# Patient Record
Sex: MALE | Race: WHITE
[De-identification: names, ages, dates, MRNs, and addresses within clinical notes are randomized per-mention and may not be internally consistent; named-entity substitution may affect disease eponyms.]

---

## 2017-09-20 ENCOUNTER — HOSPITAL ENCOUNTER (INPATIENT)
Dept: HOSPITAL 83 - SDC | Age: 77
LOS: 1 days | Discharge: HOME | DRG: 308 | End: 2017-09-21
Attending: INTERNAL MEDICINE | Admitting: INTERNAL MEDICINE
Payer: COMMERCIAL

## 2017-09-20 VITALS — DIASTOLIC BLOOD PRESSURE: 74 MMHG

## 2017-09-20 VITALS — SYSTOLIC BLOOD PRESSURE: 141 MMHG | DIASTOLIC BLOOD PRESSURE: 83 MMHG

## 2017-09-20 VITALS — HEIGHT: 67.99 IN | BODY MASS INDEX: 32.92 KG/M2 | WEIGHT: 217.19 LBS

## 2017-09-20 VITALS — DIASTOLIC BLOOD PRESSURE: 75 MMHG

## 2017-09-20 VITALS — DIASTOLIC BLOOD PRESSURE: 90 MMHG

## 2017-09-20 VITALS — DIASTOLIC BLOOD PRESSURE: 84 MMHG

## 2017-09-20 VITALS — DIASTOLIC BLOOD PRESSURE: 81 MMHG

## 2017-09-20 VITALS — DIASTOLIC BLOOD PRESSURE: 83 MMHG

## 2017-09-20 VITALS — SYSTOLIC BLOOD PRESSURE: 148 MMHG | DIASTOLIC BLOOD PRESSURE: 93 MMHG

## 2017-09-20 VITALS — DIASTOLIC BLOOD PRESSURE: 77 MMHG | SYSTOLIC BLOOD PRESSURE: 114 MMHG

## 2017-09-20 VITALS — DIASTOLIC BLOOD PRESSURE: 79 MMHG

## 2017-09-20 DIAGNOSIS — I48.92: Primary | ICD-10-CM

## 2017-09-20 DIAGNOSIS — Z92.21: ICD-10-CM

## 2017-09-20 DIAGNOSIS — Z87.891: ICD-10-CM

## 2017-09-20 DIAGNOSIS — G47.33: ICD-10-CM

## 2017-09-20 DIAGNOSIS — Z95.5: ICD-10-CM

## 2017-09-20 DIAGNOSIS — Z79.899: ICD-10-CM

## 2017-09-20 DIAGNOSIS — T39.015A: ICD-10-CM

## 2017-09-20 DIAGNOSIS — Z79.82: ICD-10-CM

## 2017-09-20 DIAGNOSIS — E66.9: ICD-10-CM

## 2017-09-20 DIAGNOSIS — K21.9: ICD-10-CM

## 2017-09-20 DIAGNOSIS — Z82.49: ICD-10-CM

## 2017-09-20 DIAGNOSIS — I48.91: ICD-10-CM

## 2017-09-20 DIAGNOSIS — K29.01: ICD-10-CM

## 2017-09-20 DIAGNOSIS — K29.80: ICD-10-CM
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
## 2017-09-20 DIAGNOSIS — Z83.3: ICD-10-CM

## 2017-09-20 DIAGNOSIS — D68.59: ICD-10-CM

## 2017-09-20 DIAGNOSIS — I10: ICD-10-CM

## 2017-09-20 DIAGNOSIS — H40.9: ICD-10-CM

## 2017-09-20 DIAGNOSIS — K57.30: ICD-10-CM

## 2017-09-20 DIAGNOSIS — Z85.51: ICD-10-CM

## 2017-09-20 DIAGNOSIS — I25.10: ICD-10-CM

## 2017-09-20 LAB
ALBUMIN SERPL-MCNC: 3.2 GM/DL (ref 3.1–4.5)
ALP SERPL-CCNC: 117 U/L (ref 45–117)
ALT SERPL W P-5'-P-CCNC: 21 U/L (ref 12–78)
AST SERPL-CCNC: 15 IU/L (ref 3–35)
BASOPHILS # BLD AUTO: 0 10*3/UL (ref 0–0.1)
BASOPHILS NFR BLD AUTO: 0.2 % (ref 0–1)
BUN SERPL-MCNC: 22 MG/DL (ref 7–24)
CHLORIDE SERPL-SCNC: 109 MMOL/L (ref 98–107)
CHOLEST SERPL-MCNC: 111 MG/DL (ref ?–200)
CK MB SERPL-MCNC: 0.9 NG/ML (ref 0.5–3.6)
CK SERPL-CCNC: 40 U/L (ref 39–308)
CREAT SERPL-MCNC: 1.4 MG/DL (ref 0.7–1.3)
EOSINOPHIL # BLD AUTO: 0.1 10*3/UL (ref 0–0.4)
EOSINOPHIL # BLD AUTO: 1.2 % (ref 1–4)
ERYTHROCYTE [DISTWIDTH] IN BLOOD BY AUTOMATED COUNT: 14 % (ref 0–14.5)
HCT VFR BLD AUTO: 44.8 % (ref 42–52)
HDLC SERPL-MCNC: 40 MG/DL (ref 40–60)
HGB BLD-MCNC: 14.1 G/DL (ref 14–18)
LDH SERPL-CCNC: 162 U/L (ref 87–241)
LDLC SERPL DIRECT ASSAY-MCNC: 43 MG/DL (ref 9–159)
LYMPHOCYTES # BLD AUTO: 1.7 10*3/UL (ref 1.3–4.4)
LYMPHOCYTES NFR BLD AUTO: 16.9 % (ref 27–41)
MAGNESIUM SERPL-MCNC: 2 MG/DL (ref 1.5–2.1)
MCH RBC QN AUTO: 29.8 PG (ref 27–31)
MCHC RBC AUTO-ENTMCNC: 31.5 G/DL (ref 33–37)
MCV RBC AUTO: 94.7 FL (ref 80–94)
MONOCYTES # BLD AUTO: 0.8 10*3/UL (ref 0.1–1)
MONOCYTES NFR BLD MANUAL: 8.2 % (ref 3–9)
NEUT #: 7.1 10*3/UL (ref 2.3–7.9)
NEUT %: 73.1 % (ref 47–73)
NRBC BLD QL AUTO: 0 % (ref 0–0)
PHOSPHATE SERPL-MCNC: 2.7 MG/DL (ref 2.5–4.9)
PLATELET # BLD AUTO: 185 10*3/UL (ref 130–400)
PMV BLD AUTO: 10.7 FL (ref 9.6–12.3)
POTASSIUM SERPL-SCNC: 3.7 MMOL/L (ref 3.5–5.1)
PROT SERPL-MCNC: 7.2 GM/DL (ref 6.4–8.2)
RBC # BLD AUTO: 4.73 10*6/UL (ref 4.5–5.9)
SODIUM SERPL-SCNC: 144 MMOL/L (ref 136–145)
T4 FREE SERPL-MCNC: 1.36 NG/DL (ref 0.76–1.46)
TRIGL SERPL-MCNC: 142 MG/DL (ref ?–150)
TROPONIN I SERPL-MCNC: 0.02 NG/ML (ref ?–0.04)
TSH SERPL DL<=0.005 MIU/L-ACNC: 1.37 UIU/ML (ref 0.36–4.75)
VLDLC SERPL CALC-MCNC: 28 MG/DL (ref 6–40)
WBC NRBC COR # BLD AUTO: 9.8 10*3/UL (ref 4.8–10.8)

## 2017-09-20 PROCEDURE — 0DBP8ZX EXCISION OF RECTUM, VIA NATURAL OR ARTIFICIAL OPENING ENDOSCOPIC, DIAGNOSTIC: ICD-10-PCS | Performed by: INTERNAL MEDICINE

## 2017-09-20 PROCEDURE — 3E073KZ INTRODUCTION OF OTHER DIAGNOSTIC SUBSTANCE INTO CORONARY ARTERY, PERCUTANEOUS APPROACH: ICD-10-PCS

## 2017-09-20 PROCEDURE — 0DB68ZX EXCISION OF STOMACH, VIA NATURAL OR ARTIFICIAL OPENING ENDOSCOPIC, DIAGNOSTIC: ICD-10-PCS | Performed by: INTERNAL MEDICINE

## 2017-09-20 PROCEDURE — 4A02XM4 MEASUREMENT OF CARDIAC TOTAL ACTIVITY, EXTERNAL APPROACH: ICD-10-PCS

## 2017-09-20 PROCEDURE — 0DBN8ZX EXCISION OF SIGMOID COLON, VIA NATURAL OR ARTIFICIAL OPENING ENDOSCOPIC, DIAGNOSTIC: ICD-10-PCS

## 2017-09-20 NOTE — NUR
16 FR F/C INSERTED IN PT AT THIS TIME.  IMMEDIATE RETURN OF CLEAR LIGHT YELLOW
URINE OBTAINED.  PT TOLERATED PROCEDURE VERY WELL.  NO C/O VOICED AT THIS
TIME.

## 2017-09-20 NOTE — NUR
DR. ASHBY NOTIFIED REGARDING PT. WITH NEW ONSET AFIB DR. ASHBY ACCEPTED PT.
FOR [facility] ADMISSION WITH CARDIAC CONSULT DR. BREWER WITH CARDIAC ENZYMES X3,
AND 12 LEAD EKG  SUPERVISOR NOTIFIED OF ADMISSION PT. GOING TO 5TH FLOOR ROOM
519

## 2017-09-20 NOTE — CON
[place], [place]
 
                                 REPORT OF CONSULTATION
 
        NAME: LEOBARDO VARGAS                   ACCT #: [id number]  
        UNIT #: [id number]                         ROOM: 519       
        DOCTOR: TRAN KANG MD                BIRTHDATE: [birth year]
 
 
DOS: 
 
REQUESTING PHYSICIAN:  Dr. Herrera.
 
REASON FOR CONSULTATION:  Incidental finding of new onset atrial fibrillation.
 
ASSESSMENT:
1.  Current admission due to incidental finding of atrial fibrillation in the
process of patient getting his colonoscopy.
2.  Shortness of breath, dyspnea on exertion, but no symptomatic palpitation.
3.  No chest pain, heaviness, tightness or jaw pain.
4.  History of coronary artery disease, status post PTCA stent placement with
Dr. Syed back in 2012.  No record available to me at this time.
5.  Hypertension.
6.  Unknown level of lipid.
7.  Previous history of tobacco abuse, the patient quit a year ago.
8.  History of bladder cancer, status post surgery and chemotherapy in 2000.
9.  Current evidence of blood in the stool (hemoglobin and hematocrit is still
pending).
 
PLAN:
1.  Cycle cardiac enzymes.
2.  Check D-dimer.
3.  Fasting lipid panel.
4.  Initiate heparin or Lovenox or Noak's after results of CBC is available.
5.  Lopressor 25 mg q. 6 hours (dose will be switched to long-acting in a.m.).
6.  Wean off Cardizem drip after starting beta-blocker for heart at least in 90.
7.  Echocardiogram.
8.  NPO for a Lexiscan stress test in a.m.
9.  Attempt for KATHY cardioversion will be done in 3-4 weeks as an outpatient.
10.  Consider highly recommend sleep study as an outpatient.
11.  Exercise and weight loss.
12.  Follow up in our clinic in 4 weeks here in [place].
 
HISTORY OF PRESENT ILLNESS:  The patient is a pleasant 77-year-old gentleman
well known to our group with Dr. Marroquin.  The patient apparently underwent a
PTCA stent placement following an acute myocardial infarction back in 2012 with
Dr. Syed.  No record available to me at this time, but from the Cardiology
point of view, the patient apparently had been doing relatively well, was last
seen by Dr. Marroquin, but she had not followed up with our group following after
her departure.  The patient denies ever any complaint of chest pain, chest
pressure, heaviness or tightness.  Never there is no jaw pain, no left arm pain,
no back pain.  The patient presented to the hospital for a colonoscopy where we
found incidentally to be in AFib.  The patient denies any specific cardiac
complaint, but he has been short of breath for the past year with no significant
deterioration over the past few weeks.  No fever, no chills, no night sweats. 
Maintain good appetite, no weight loss.  Sleeps on 2 pillows with no reported
PND, orthopnea or pedal edema.  The patient does give a history of loud snoring
(his wife was present during this exam).  The patient has been doing relatively
well from the heart point of view.
 
                              [place], [place]
 
                                 REPORT OF CONSULTATION
 
        NAME: LEOBARDO VARGAS                   ACCT #: [id number]  
        UNIT #: [id number]                         ROOM: [facility]       
        DOCTOR: TRAN KANG MD                BIRTHDATE: [birth year]
 
 
 
PAST MEDICAL HISTORY:  As detailed in my assessment.
 
SOCIAL HISTORY:  The patient quit smoking last year.  Prior to that, he smoked
since he was 13-year-old.  No heavy alcohol or illicit drug abuse.
 
FAMILY HISTORY:  There is no early family history of heart disease.
 
CURRENT MEDICATIONS:  On presentation to the hospital include Xalatan, aspirin,
Neurontin, Prinivil, Procardia, omeprazole and multivitamin.
 
ALLERGIES:  The patient has no known drug allergies.
 
REVIEW OF SYSTEMS:  Currently, the patient denies any headache, diplopia or
blurry vision.  No fever, no chills, no night sweats.  No abdominal pain, no
more bright red blood per rectum.  No reported tarry stools.  The patient admits
to joint pain and low back pain, but no muscular pain, no anxiety, no
depression. no o polyuria, no polydipsia, no skin rash.  Review of all other
systems has been negative.
 
PHYSICAL EXAMINATION:
GENERAL:  The patient is alert, oriented x3, quite pleasant.  The patient is
flat in bed in no apparent distress.
VITAL SIGNS:  Blood pressure 141/90, heart rate 106, respiratory rate of 16,
temperature 98.2.
HEENT:  Extraocular muscles intact.  Pupils equal, round and reactive to light. 
Conjunctivae:  No pallor.  Throat:  No petechiae.
NECK:  Good upstroke.  Unable to appreciate any bruit.  No lymphadenopathy, no
thyromegaly.
HEART:  S1, S2 with distant heart sounds.  Unable to appreciate any rub, no
retrosternal heave.
CHEST AND BACK:  No deformities.
LUNGS:  Decreased air movement, but no enoch wheezing or rales.
ABDOMEN:  Obese, soft, nontender.  Present bowel sounds.  No masses, no bruits.
LOWER EXTREMITIES:  There is mild ankle edema with faint distal pulses.
NEUROLOGIC:  Grossly nonfocal.
SKIN:  No significant rash.
 
LABORATORY DATA:  CPK is 40, MB is 0.9, troponin 0.022.  No other labs are
available to me at this time.
 
 
 
 
                              [place], [place]
 
                                 REPORT OF CONSULTATION
 
        NAME: LEOBARDO VARGAS                   ACCT #: [id number]  
        UNIT #: [id number]                         ROOM: 519       
        DOCTOR: TRAN KANG MD                BIRTHDATE: [birth year]
 
 
_________________________________
TRAN KANG MD
 
CM:CONSTR:REPORT OF CONSULTATION
 
D: 09/20/17 1205   T: 09/20/17 09/20/17     [id number]                                          interface

## 2017-09-20 NOTE — NUR
FAMILY REQUESTED DR. ASHBY NOT TO BE ON THE CASE AND WANTED THE HOSPITALIST
DR. ASHBY NOTIFIED OF REQUESTED. DR. GUNTER HOSPITALIST NOTIFIED AND ACCEPTED
CARE OF PT. THE ACCEPTING NURSE ON 5TH FLOOR WAS INFORMED OF THE CHANGE ALONG
WITH THE ORDERS FROM THE DOCTOR THAT WAS INITIATED IN RECOVERY.

## 2017-09-20 NOTE — O
[place], [place]
 
                                      OPERATIVE NOTE
 
        NAME: LEOBARDO VARGAS                  ACCT #: [id number]  
        UNIT #: [id number]                        ROOM: 519       
        DOCTOR: MUMTAZ JOHN MD             BIRTHDATE: [birth year]
 
 
DOS: 
 
PROCEDURE #1
 
HISTORY OF PRESENT ILLNESS:  The patient is a 77-year-old who has presented with
a chief complaint of guaiac positivity, undergoing investigation.
 
ALLERGIES:  No known medication.
 
FAMILY HISTORY:  Noncontributory.
 
PAST SURGICAL HISTORY:  Back and bladder CA.
 
PAST MEDICAL HISTORY:  Hypertension and chronic back pain.
 
SOCIAL HISTORY:  Nonsmoker.  Social alcohol consumer.  The patient is on
aspirin.
 
PROCEDURE:  Today's procedure part of investigation is panendoscopy and
colonoscopy.
 
PREMEDICATIONS:  Versed and Diprivan.
 
SCOPE:  Olympus forward-viewing gastroscope Q10 video.
 
REPORT:  After putting the patient in left lateral position and after
application of lubricant to the scope, the scope was introduced.  Thereafter,
under direct visualization, I advanced through the length of esophagus without
difficulty.  Gastric pouch was entered.  Multiple antral erosions were
identified.  Biopsy from margin of one was obtained.  Photographic series done. 
Duodenitis was noticed.  The patient was gradually extubated along the greater
curvature.  The patient tolerated the procedure well.
 
IMPRESSION:  Gastritis, multiple gastric erosions most likely secondary to
aspirin status post biopsy.
 
PLAN AND DISCUSSION:  The patient is going to be started on omeprazole 20 mg 1
daily for duodenitis and gastric erosions.  The patient's aspirin is going to be
advised to be taken only on full stomach.  On the other hand, we are going to
proceed with colonoscopy.
 
PROCEDURE #2
 
HISTORY OF PRESENT ILLNESS:  A 77-year-old patient who has presented with chief
complaint of guaiac positivity, undergoing investigation.
 
PROCEDURE:  Today's procedure part of investigation is colonoscopy plus
piecemeal polypectomy of the polyp at 7 cm in rectal pouch, which is broad plus
tattoo marking of the site.
 
 
                              [place], [place]
 
                                      OPERATIVE NOTE
 
        NAME: LEOBARDO VARGAS                  ACCT #: [id number]  
        UNIT #: [id number]                        ROOM: [facility]       
        DOCTOR: MUMTAZ JOHN MD             BIRTHDATE: [birth year]
 
 
REPORT:  After putting the patient in the left lateral position and after
application of lubricant to rectal pouch and digital examination, scope was
introduced.  Thereafter, under direct visualization, I advanced through the
length of colon without difficulty.  Base of the cecum explored.  Appendiceal
orifice identified and ileocecal valve was defined.  Scope was withdrawn back to
the sigmoid colon where there is moderate diverticulosis.  There was some
residual retained stool.  As much as possible, this was suctioned out and
lavaged.  In the rectal pouch at approximately 7 cm from rectal sphincter,
broad-based polypoid lesion, which is fragile in its consistency with piecemeal
polypectomy assessed.  I am concerned for dysplastic cell in this polyp.  Tattoo
marking of the site was performed and air was suctioned out.  The patient
extubated and tolerated the procedure well.
 
IMPRESSION:  Broad based sessile polypoid lesion at 7 cm rectal pouch, ruling
out carcinoma, status post piecemeal polypectomy, status post tattoo marking,
diverticulosis of sigmoid colon in moderate degree and some retained stool.
 
PLAN AND DISCUSSION:  Withholding aspirin for 3 days, omeprazole 20 mg 1 daily
for the upper GI findings and we will await pathology, which has been earmarked
to rule out carcinoma.
 
 
 
_________________________________
MUMTAZ JOHN MD
 
CM:OPRECORD:OPERATIVE NOTE
 
D: 09/20/17 0836
T: 09/20/17 1027
    
CALEB JOHN MD            
                                                            
09/20/17
1402
                              
interface

## 2017-09-20 NOTE — PR
[place], [place]
 
                                      PROGRESS NOTE
 
        [facility] LEOBARDO VARGAS                 [id number]  
        UNIT #: [id number]                       ROOM: 519       
        DOCTOR: TRAN KANG MD              BIRTHDATE: [birth year]
 
 
DOS: 09/21/2017
 
SUBJECTIVE:  The patient is sitting in the stress lab.  Denies any specific
cardiac complaint.  He is more alert, more comfortable, no specific cardiac
complaint.  No chest pain, no chest pressure, no symptomatic palpitation.
 
OBJECTIVE:
VITAL SIGNS:  Blood pressure 128/64, heart rate 68, respiratory rate of 14,
temperature 97.8.
NECK:  Good upstroke, no bruit.
HEART:  S1, S2 with no rub.
LUNGS:  Decreased air movement, but no enoch wheezing or rales.
ABDOMEN:  Obese, soft, nontender.  Present bowel sounds.
EXTREMITIES:  Lower extremities, no significant edema.
 
LABORATORY DATA:  White count 9.3, hemoglobin 13.5.  Potassium 3.4, GFR more
than 60%.  Hemoglobin A1c 6.2.
 
ASSESSMENT AND PLAN:  Incidental finding of atrial fibrillation on patient's
presentation for colonoscopy and EGD.  So far, the patient has been doing
relatively well.  He is completely asymptomatic, his heart rate is quite well
controlled on the current dose of beta blocker.  There was also no evidence of
any bleed.  For that, we will switch Lopressor to 50 mg twice a day along with
switching Lovenox to Xarelto 20 mg once a day with food.  The patient can be
discharged.  We will be seeing him in clinic within 3 weeks to consider KATHY and
cardioversion.
 
Highly recommend checking for sleep study for possible obstructive sleep apnea
in view of his current diagnosis.  Exercise, weight loss is also highly
recommended.  Again, we will be seeing the patient within 3-4 weeks in our
clinic here.
 
 
 
_________________________________
TRAN KANG MD
 
CM:TEZ
 
D: 09/21/17 1137                 
T: 09/21/17 1427
             
                                                            
TRAN KANG MD              
                                                            
09/22/17
0634
                              
interface

## 2017-09-20 NOTE — NUR
A 77, admitted to , under the
services of HUGO Urias DO with a diagnosis of ATRIAL FIB/FLUTTER.
Chief complaint is RAPID HEART RATE.
Patient arrived via bed from OP/ADMIT.
Monitor applied. Initial assessment completed.
Vital signs taken and recorded.
HUGO URIAS DO notified of admission to the unit.
Orders received.
See assessment for past medical history, medications
and allergies.
Patient and/or family oriented to unit. [facility]
visitation policy reviewed.
Clothing/patient valuable form completed.
 
SISSY HENRY

## 2017-09-20 NOTE — ST
[place], [place]
 
                               EXERCISE STRESS TEST REPORT
 
        NAME: LEOBARDO VARGAS                  [id number]  
        [facility] #: [id number]                        ROOM: [facility]       
        DOCTOR: TRAN KANG MD               BIRTHDATE: [birth year]
 
 
DOS: 
 
INDICATION:  Atrial fibrillation.
 
PROCEDURE:  The patient was brought into the stress lab.  The procedure was
explained with risks, benefits, and alternatives.  Lexiscan was injected.  The
patient tolerated the procedure well.  Following the injection, there was no
evidence of any significant ST or T-wave changes suggestive of myocardial
ischemia.  No arrhythmias were noted.  Occasional aberrantly conducted beats
were seen.
 
BLOOD PRESSURE RESPONSE:  Resting blood pressure 168/80 with ending blood
pressure 148/68.
 
FINAL IMPRESSION:
1.  Adequate Lexiscan stress test.
2.  Negative Lexiscan stress test for stress-induced myocardial ischemia.
3.  No arrhythmias were noted.
4.  Nuclear images will be reported separately.
 
 
 
_________________________________
TRAN KANG MD
 
CM:STRESS:EXERCISE STRESS TEST REPORT 
 
 
D: 09/21/17 1127
T: 09/21/17 1421
    
                                                            
TRAN KANG MD

## 2017-09-21 VITALS — DIASTOLIC BLOOD PRESSURE: 64 MMHG

## 2017-09-21 VITALS — DIASTOLIC BLOOD PRESSURE: 79 MMHG

## 2017-09-21 VITALS — DIASTOLIC BLOOD PRESSURE: 77 MMHG

## 2017-09-21 VITALS — DIASTOLIC BLOOD PRESSURE: 81 MMHG

## 2017-09-21 VITALS — DIASTOLIC BLOOD PRESSURE: 81 MMHG | SYSTOLIC BLOOD PRESSURE: 148 MMHG

## 2017-09-21 VITALS — DIASTOLIC BLOOD PRESSURE: 76 MMHG | SYSTOLIC BLOOD PRESSURE: 130 MMHG

## 2017-09-21 VITALS — DIASTOLIC BLOOD PRESSURE: 80 MMHG

## 2017-09-21 LAB
25(OH)D3 SERPL-MCNC: 32.7 NG/ML (ref 30–100)
APTT PPP: 27.2 SECONDS (ref 20.8–31.5)
BASOPHILS # BLD AUTO: 0 10*3/UL (ref 0–0.1)
BASOPHILS NFR BLD AUTO: 0.2 % (ref 0–1)
BUN SERPL-MCNC: 19 MG/DL (ref 7–24)
CHLORIDE SERPL-SCNC: 110 MMOL/L (ref 98–107)
CHOLEST SERPL-MCNC: 113 MG/DL (ref ?–200)
CREAT SERPL-MCNC: 1.15 MG/DL (ref 0.7–1.3)
EOSINOPHIL # BLD AUTO: 0.2 10*3/UL (ref 0–0.4)
EOSINOPHIL # BLD AUTO: 1.6 % (ref 1–4)
ERYTHROCYTE [DISTWIDTH] IN BLOOD BY AUTOMATED COUNT: 14.2 % (ref 0–14.5)
HCT VFR BLD AUTO: 42.9 % (ref 42–52)
HDLC SERPL-MCNC: 40 MG/DL (ref 40–60)
HGB BLD-MCNC: 13.5 G/DL (ref 14–18)
INR BLD: 1.1 (ref 2–3.5)
LDLC SERPL DIRECT ASSAY-MCNC: 48 MG/DL (ref 9–159)
LYMPHOCYTES # BLD AUTO: 1.9 10*3/UL (ref 1.3–4.4)
LYMPHOCYTES NFR BLD AUTO: 20.6 % (ref 27–41)
MAGNESIUM SERPL-MCNC: 1.9 MG/DL (ref 1.5–2.1)
MCH RBC QN AUTO: 29.9 PG (ref 27–31)
MCHC RBC AUTO-ENTMCNC: 31.5 G/DL (ref 33–37)
MCV RBC AUTO: 95.1 FL (ref 80–94)
MONOCYTES # BLD AUTO: 0.9 10*3/UL (ref 0.1–1)
MONOCYTES NFR BLD MANUAL: 9.5 % (ref 3–9)
NEUT #: 6.3 10*3/UL (ref 2.3–7.9)
NEUT %: 67.8 % (ref 47–73)
NRBC BLD QL AUTO: 0 % (ref 0–0)
PHOSPHATE SERPL-MCNC: 2 MG/DL (ref 2.5–4.9)
PLATELET # BLD AUTO: 170 10*3/UL (ref 130–400)
PMV BLD AUTO: 11.7 FL (ref 9.6–12.3)
POTASSIUM SERPL-SCNC: 3.4 MMOL/L (ref 3.5–5.1)
RBC # BLD AUTO: 4.51 10*6/UL (ref 4.5–5.9)
SODIUM SERPL-SCNC: 143 MMOL/L (ref 136–145)
TRIGL SERPL-MCNC: 127 MG/DL (ref ?–150)
VITAMIN B12: 243 PG/ML (ref 247–911)
VLDLC SERPL CALC-MCNC: 25 MG/DL (ref 6–40)
WBC NRBC COR # BLD AUTO: 9.3 10*3/UL (ref 4.8–10.8)

## 2017-09-21 NOTE — NUR
DISCHARGE INSTRUCTIONS REVIEWED. HEPLOCK AND CARDIAC MONITOR DISCONTINUED. PT
AMBULATED OFF THE FLOOR WITH HIS WIFE. REFUSED WHEELCHAIR.

## 2017-09-21 NOTE — NUR
INFORMED CONSENT SIGNED FOR LEXISCAN WITH DR KANG. RESTING EKG AF WITH RARE
PVC AND RBBB. POX 93-94% VIA RA, DIFFUSE RHONCHI, CLEARING WITH A COUGH.
COMPLETED ONE MINUTE OF LEXISCAN PROTOCOL RECEIVING LEXISCAN 0.4 MG OVER 10
SECONDS. DEVELOPED SOB THAT WAS RELIEVED DURING RECOVERY.
HAD A PEAK HR  WITH A BP /70.
LAST RECOVERY HR  WITH A BP /68.
AWAITING NUCLEAR IMAGING IN STABLE CONDITION.

## 2017-09-21 NOTE — NUR
case management attempted to visit with patient, patient was out of room at
this time, will see later today

## 2017-10-10 ENCOUNTER — HOSPITAL ENCOUNTER (OUTPATIENT)
Dept: HOSPITAL 83 - SDC | Age: 77
Discharge: HOME | End: 2017-10-10
Payer: COMMERCIAL

## 2017-10-10 VITALS — WEIGHT: 217 LBS | BODY MASS INDEX: 32.89 KG/M2 | HEIGHT: 67.99 IN

## 2017-10-10 VITALS — DIASTOLIC BLOOD PRESSURE: 90 MMHG

## 2017-10-10 VITALS — DIASTOLIC BLOOD PRESSURE: 82 MMHG

## 2017-10-10 VITALS — DIASTOLIC BLOOD PRESSURE: 145 MMHG

## 2017-10-10 VITALS — SYSTOLIC BLOOD PRESSURE: 135 MMHG | DIASTOLIC BLOOD PRESSURE: 84 MMHG

## 2017-10-10 DIAGNOSIS — E66.01: ICD-10-CM

## 2017-10-10 DIAGNOSIS — Z79.82: ICD-10-CM

## 2017-10-10 DIAGNOSIS — I10: ICD-10-CM

## 2017-10-10 DIAGNOSIS — I48.4: ICD-10-CM

## 2017-10-10 DIAGNOSIS — I25.10: ICD-10-CM

## 2017-10-10 DIAGNOSIS — I48.91: Primary | ICD-10-CM

## 2017-10-10 DIAGNOSIS — J44.9: ICD-10-CM

## 2017-10-10 DIAGNOSIS — Z79.899: ICD-10-CM

## 2017-10-11 ENCOUNTER — HOSPITAL ENCOUNTER (INPATIENT)
Dept: HOSPITAL 83 - ED | Age: 77
LOS: 1 days | Discharge: HOME | DRG: 292 | End: 2017-10-12
Attending: INTERNAL MEDICINE | Admitting: INTERNAL MEDICINE
Payer: COMMERCIAL

## 2017-10-11 VITALS — DIASTOLIC BLOOD PRESSURE: 88 MMHG | SYSTOLIC BLOOD PRESSURE: 156 MMHG

## 2017-10-11 VITALS — SYSTOLIC BLOOD PRESSURE: 150 MMHG | DIASTOLIC BLOOD PRESSURE: 87 MMHG

## 2017-10-11 VITALS — HEIGHT: 68 IN | WEIGHT: 212.44 LBS | BODY MASS INDEX: 32.2 KG/M2

## 2017-10-11 VITALS — DIASTOLIC BLOOD PRESSURE: 77 MMHG

## 2017-10-11 VITALS — DIASTOLIC BLOOD PRESSURE: 85 MMHG | SYSTOLIC BLOOD PRESSURE: 149 MMHG

## 2017-10-11 VITALS — DIASTOLIC BLOOD PRESSURE: 99 MMHG

## 2017-10-11 VITALS — SYSTOLIC BLOOD PRESSURE: 148 MMHG | DIASTOLIC BLOOD PRESSURE: 77 MMHG

## 2017-10-11 VITALS — DIASTOLIC BLOOD PRESSURE: 82 MMHG

## 2017-10-11 VITALS — DIASTOLIC BLOOD PRESSURE: 94 MMHG

## 2017-10-11 DIAGNOSIS — I11.0: Primary | ICD-10-CM

## 2017-10-11 DIAGNOSIS — D72.829: ICD-10-CM

## 2017-10-11 DIAGNOSIS — I25.10: ICD-10-CM

## 2017-10-11 DIAGNOSIS — K57.90: ICD-10-CM

## 2017-10-11 DIAGNOSIS — Z79.899: ICD-10-CM

## 2017-10-11 DIAGNOSIS — I48.92: ICD-10-CM

## 2017-10-11 DIAGNOSIS — H40.9: ICD-10-CM

## 2017-10-11 DIAGNOSIS — I50.41: ICD-10-CM

## 2017-10-11 DIAGNOSIS — E66.09: ICD-10-CM

## 2017-10-11 DIAGNOSIS — Z84.89: ICD-10-CM

## 2017-10-11 DIAGNOSIS — I49.5: ICD-10-CM

## 2017-10-11 DIAGNOSIS — Z95.5: ICD-10-CM

## 2017-10-11 DIAGNOSIS — N17.9: ICD-10-CM

## 2017-10-11 DIAGNOSIS — Z79.82: ICD-10-CM

## 2017-10-11 DIAGNOSIS — J96.11: ICD-10-CM

## 2017-10-11 DIAGNOSIS — Z82.49: ICD-10-CM

## 2017-10-11 DIAGNOSIS — D68.59: ICD-10-CM

## 2017-10-11 DIAGNOSIS — Z83.3: ICD-10-CM

## 2017-10-11 DIAGNOSIS — K21.0: ICD-10-CM

## 2017-10-11 DIAGNOSIS — I48.2: ICD-10-CM

## 2017-10-11 LAB
ALBUMIN SERPL-MCNC: 3.1 GM/DL (ref 3.1–4.5)
ALBUMIN SERPL-MCNC: 3.5 GM/DL (ref 3.1–4.5)
ALP SERPL-CCNC: 113 U/L (ref 45–117)
ALP SERPL-CCNC: 122 U/L (ref 45–117)
ALT SERPL W P-5'-P-CCNC: 28 U/L (ref 12–78)
ALT SERPL W P-5'-P-CCNC: 32 U/L (ref 12–78)
APTT PPP: 25.2 SECONDS (ref 20.8–31.5)
AST SERPL-CCNC: 25 IU/L (ref 3–35)
AST SERPL-CCNC: 27 IU/L (ref 3–35)
BASOPHILS # BLD AUTO: 2 % (ref 0–1)
BUN SERPL-MCNC: 37 MG/DL (ref 7–24)
BUN SERPL-MCNC: 40 MG/DL (ref 7–24)
CHLORIDE SERPL-SCNC: 104 MMOL/L (ref 98–107)
CHLORIDE SERPL-SCNC: 105 MMOL/L (ref 98–107)
CREAT SERPL-MCNC: 2.8 MG/DL (ref 0.7–1.3)
CREAT SERPL-MCNC: 2.83 MG/DL (ref 0.7–1.3)
ERYTHROCYTE [DISTWIDTH] IN BLOOD BY AUTOMATED COUNT: 13.7 % (ref 0–14.5)
HCT VFR BLD AUTO: 44.7 % (ref 42–52)
HGB BLD-MCNC: 14 G/DL (ref 14–18)
INR BLD: 1.3 (ref 2–3.5)
MAGNESIUM SERPL-MCNC: 2 MG/DL (ref 1.5–2.1)
MCH RBC QN AUTO: 29.4 PG (ref 27–31)
MCHC RBC AUTO-ENTMCNC: 31.3 G/DL (ref 33–37)
MCV RBC AUTO: 93.9 FL (ref 80–94)
NRBC BLD QL AUTO: 0 % (ref 0–0)
PLATELET # BLD AUTO: 200 10*3/UL (ref 130–400)
PLATELET SUFFICIENCY: NORMAL
PMV BLD AUTO: 11.2 FL (ref 9.6–12.3)
POTASSIUM SERPL-SCNC: 4.5 MMOL/L (ref 3.5–5.1)
POTASSIUM SERPL-SCNC: 4.8 MMOL/L (ref 3.5–5.1)
PROT SERPL-MCNC: 6.7 GM/DL (ref 6.4–8.2)
PROT SERPL-MCNC: 7.7 GM/DL (ref 6.4–8.2)
RBC # BLD AUTO: 4.76 10*6/UL (ref 4.5–5.9)
RBC MORPH BLD: NORMAL
SODIUM SERPL-SCNC: 143 MMOL/L (ref 136–145)
SODIUM SERPL-SCNC: 143 MMOL/L (ref 136–145)
TOTAL CELLS COUNTED: 100 #CELLS
TROPONIN I SERPL-MCNC: 0.04 NG/ML (ref ?–0.04)
TSH SERPL DL<=0.005 MIU/L-ACNC: 2.16 UIU/ML (ref 0.36–4.75)
WBC NRBC COR # BLD AUTO: 13.8 10*3/UL (ref 4.8–10.8)

## 2017-10-11 PROCEDURE — 5A09357 ASSISTANCE WITH RESPIRATORY VENTILATION, LESS THAN 24 CONSECUTIVE HOURS, CONTINUOUS POSITIVE AIRWAY PRESSURE: ICD-10-PCS | Performed by: INTERNAL MEDICINE

## 2017-10-11 NOTE — DS
[place], [place]
 
                                    DISCHARGE SUMMARY
 
        NAME: LEOBARDO VARGAS                  ACCT #: [id number]  
        UNIT #: [id number]                        ROOM: [facility]    
        DOCTOR: SNOW GRACIA MD             BIRTHDATE: [birth year]
 
 
DOS: 10/12/2017
 
DISCHARGE DIAGNOSES:
1.  Acute over chronic kidney failure.
2.  Acute congestive heart failure, mixed systolic and diastolic type.
3.  Sinus bradycardia.
4.  Benign essential hypertension.
5.  Gastroesophageal reflux disease and esophagitis.
6.  History of atrial fibrillation/flutter in the past, status post
cardioversion.
7.  Coronary artery disease of native vessels.
8.  Primary thrombophilia and hypercoagulable state.
9.  Obesity.
10.  Diverticulosis.
 
HOSPITAL COURSE:  The patient was admitted at [facility] after
he was found to be in CHF and bradycardia after cardioversion was performed
earlier in the day.  The patient was admitted to the ICU and monitored closely. 
The patient was diuresed with IV Lasix and his breathing has improved.  The
patient is being assessed for home oxygen at present time.  Cardiac enzymes were
negative.
 
Acute over chronic kidney failure.  The patient's BUN and creatinine were
elevated and kidney function has worsened, apparently related to CHF and
bradycardia.
 
Sinus bradycardia.  The patient's metoprolol was stopped and for his hypotension
lisinopril was stopped also.
 
Benign essential hypertension, now controlled.  His blood pressure medicines
have been stopped.
 
GERD and esophagitis, asymptomatic.
 
History of atrial flutter and fibrillation.  The patient is status post
cardioversion.
 
Coronary artery disease of native vessels, without any chest pains.
 
The patient was evaluated by Dr. Reyes and Dr. Caputo, the cardiologist and
cleared for discharge to home today.
 
Chronic respiratory failure and hypoxemia.  The patient requires 2 liters of
oxygen at home continuously now and is being arranged prior to his discharge.
 
LABORATORY DATA:  Cardiac enzymes were negative.  Chest x-ray showed some mild
vascular congestion.  BNP elevated to 9600.  BUN and creatinine 37 and 2.8. 
Chronic kidney disease stage 4 now.
 
DISCHARGE MANAGEMENT:  Latanoprost eyedrops to left eye 1 drop every night,
 
                              [place], [place]
 
                                    DISCHARGE SUMMARY
 
        NAME: LEOBARDO VARGAS                  ACCT #: [id number]  
        UNIT #: [id number]                        ROOM: [facility]    
        DOCTOR: SNOW GRACIA MD             BIRTHDATE: [birth year]
 
 
Xarelto 20 mg a day, gabapentin 300 mg b.i.d., vitamin B12 500 mcg daily,
omeprazole 20 mg a day.  The patient was started on oxygen 2 liters per minute
continuous by nasal cannula because he was hypoxemic and pulse ox dropped to 88%
without oxygen at rest and with ambulation.
 
 
 
_________________________________
SNOW GRACIA MD
 
CM:DISCHCIRILO
 
D: 10/12/17 1537
T: 10/12/17 2041
    
                                                            
SNOW GRACIA MD            
                                                            
10/12/17
2040
                              
interface

## 2017-10-11 NOTE — NUR
1400 c/o nausea. Dr. Sena notified and order for zofran recieved. On
entering room to administer . Pt. stated he no longer needed it and felt ok at
this time. med wasted.

## 2017-10-11 NOTE — WRIGHTHP
[place], [place]
 
                               PATIENT HISTORY AND PHYSICAL EXAM
 
        NAME: LEOBARDO VARGAS                  [facility] #: [id number]  
        UNIT #: [id number]                        ROOM: [facility]    
        DOCTOR: SNOW GRACIA MD             BIRTHDATE: [birth year]
 
 
DOS: 10/11/2017
 
HISTORY OF PRESENT ILLNESS:  The patient is a 77-year-old gentleman with past
medical history of:
1.  Atrial fibrillation/flutter, status post cardioversion yesterday.
2.  Diverticulosis.
3.  Benign essential hypertension.
4.  GERD.
5.  Coronary artery disease of the native vessels.
6.  Obesity.
7.  Primary thrombophilia and hypercoagulable state.
 
The patient presented to the Emergency Department at [facility] after undergoing cardioversion in the morning.  The patient had
increasing shortness of breath and some chest discomfort.  In the Emergency
Department, the patient was found to be bradycardic and in congestive heart
failure.  The patient was recommended for admission and further management. 
After admission, the patient is starting to feel somewhat better.  He is still
on oxygen.  No more complaint of chest pains.  He had no dizziness or fainting
episodes.  No other GI or urinary symptoms.
 
REVIEW OF SYSTEMS:
LUNGS:  Some increasing shortness of breath.
GASTROINTESTINAL:  No nausea, vomiting, diarrhea or constipation.
CARDIOVASCULAR:  Some chest discomfort.  No palpitations.
 
SOCIAL HISTORY:  The patient does not smoke cigarettes, drink alcohol and denies
any drug abuse.
 
ALLERGIES:  No known drug allergies.
 
MEDICATIONS:  The patient on Latanoprost eyedrops, Xarelto, gabapentin,
omeprazole, aspirin.
 
PHYSICAL EXAMINATION:
GENERAL:  Alert and oriented x 3, in no visible distress.
HEENT AND NECK:  Extraocular movements are intact.  Sclerae are anicteric.  Oral
mucosa is moist and clean.  No obvious facial weakness.  Neck is supple without
any lymphadenopathy.  No thyromegaly.  No JVD.  No carotid arterial bruits.  The
patient is wearing oxygen by nasal cannula.
LUNGS:  Clear to auscultation.  No wheezing.  No rhonchi.
CARDIOVASCULAR SYSTEM:  Heart rate is regular in rate and rhythm.  S1 and S2
normally audible.  No significant murmur or any other abnormal cardiac sounds.
ABDOMEN:  Soft, nontender.  No obvious organomegaly.  Bowel sounds are present. 
No obvious herniation.
EXTREMITIES:  Without significant cyanosis or edema.  Warm to touch.
CENTRAL NERVOUS SYSTEM:  Alert and oriented x 3.  Cranial nerves II-XII are
intact.  Speech is normal.  The patient is able to move all extremities.  Normal
muscle strength.  Deep tendon reflexes are equal on both sides.  Plantars were
downgoing.
 
                              [place], [place]
 
                               PATIENT HISTORY AND PHYSICAL EXAM
 
        NAME: LEOBARDO VARGAS                  ACCT #: [id number]  
        UNIT #: [id number]                        ROOM: [facility]    
        DOCTOR: SNOW GRACIA MD             BIRTHDATE: [birth year]
 
 
 
LABORATORY DATA:  The patient's cardiac enzymes have been negative so far. 
ProBNP elevated to 9670.  BUN and creatinine elevated at 37 and 2.8.
 
IMPRESSION:
1.  Acute over chronic kidney failure.  The patient's kidney function is to be
monitored and serum electrolytes to be followed on daily basis.
2.  Acute congestive heart failure, treated with diuresis.  We will check
echocardiogram.
3.  Sinus bradycardia, treated with stopping metoprolol.  The patient is in ICU
and still remains bradycardic.  The patient's cardiac enzymes are being checked
and have been normal so far.
4.  Benign essential hypertension.  Blood pressure is being monitored and
controlled.
5.  Gastroesophageal reflux disease and esophagitis, asymptomatic.
6.  History of chronic atrial fibrillation and flutter.  The patient
cardioverted yesterday morning and remains in sinus bradycardia.
7.  Coronary artery disease of the native vessels, without chest pains.
8.  Dr. Reyes and Dr. Caputo, the cardiologist to follow.
 
 
 
_________________________________
SNOW GRACIA MD
 
CM:HISPHYS:PATIENT HISTORY AND PHYSICAL EXAMINATION
 
D: 10/11/17 0956
T: 10/11/17 1340
    
                                                            
SNOW GRACIA MD            
                                                            
10/11/17
1339
                              
interface

## 2017-10-11 NOTE — NUR
PHYSICAL THERAPY
Patient and wife both reports patient has no PT needs. He is (i) with
mobility. Thank you for this referral. Bonnie Hartmann,PT

## 2017-10-11 NOTE — NUR
CONSULT CALLED TO DOCTOR BELVEDERE NEW ORDERS GIVEN TO HOLD LISINOPRIL DUE TO
RENAL FUNCTION AND LABS TO BE DRAWN THIS AM. HE SAID HE WOULD BE IN TO SEE
PATIENT TODAY.

## 2017-10-11 NOTE — NUR
in to talk to patient.
Patient states lives at HOME with HIS WIFE.
There are 12 steps in the home.
Physician: DR COSTA
[facility]
Tea health services: NONE
Patient's level of ADLs: INDEPENDENT
Patient has working utilities: YES
DME: [facility]
Follow-up physician's appointment after d/c: CANE
Does patient want to access PORTAL?:
Discharge plan HOME.
 
HELGA DANIELS

## 2017-10-11 NOTE — NUR
A 77, admitted to ICCU, under the
services of Dr. BASIL KATE,SNOW GUY with a diagnosis of CHF AMD SYMPTOMATIC
BRADYCARDIA.
Chief complaint is INCREASED SHORTNESS OF BREATH AND CHEST PRESSURE.
Patient arrived via stretcher from ER.
Monitor applied. Initial assessment completed.
Vital signs taken and recorded.
DR. BASIL KATE,SNOW GUY notified of admission to the unit.
Orders received.
See assessment for past medical history, medications
and allergies.
Patient and/or family oriented to unit. [facility] ICCU
visitation policy reviewed.
Clothing/patient valuable form completed.
 
SYED RUTHERFORD

## 2017-10-11 NOTE — NUR
VERBAL ORDER GIVEN BY DR ROMAN TO GIVE 0.5MG ATROPINE IVP. UNABLE TO ORDER
THE MEDICATION AT THIS DOSE. ONLY AVAILABLE 1MG. WASTED 0.5MG

## 2017-10-12 VITALS — DIASTOLIC BLOOD PRESSURE: 64 MMHG | SYSTOLIC BLOOD PRESSURE: 135 MMHG

## 2017-10-12 VITALS — SYSTOLIC BLOOD PRESSURE: 141 MMHG | DIASTOLIC BLOOD PRESSURE: 72 MMHG

## 2017-10-12 VITALS — DIASTOLIC BLOOD PRESSURE: 71 MMHG

## 2017-10-12 VITALS — SYSTOLIC BLOOD PRESSURE: 155 MMHG | DIASTOLIC BLOOD PRESSURE: 80 MMHG

## 2017-10-12 VITALS — DIASTOLIC BLOOD PRESSURE: 64 MMHG | SYSTOLIC BLOOD PRESSURE: 130 MMHG

## 2017-10-12 LAB
BASOPHILS # BLD AUTO: 0 10*3/UL (ref 0–0.1)
BASOPHILS NFR BLD AUTO: 0.3 % (ref 0–1)
BUN SERPL-MCNC: 46 MG/DL (ref 7–24)
CHLORIDE SERPL-SCNC: 103 MMOL/L (ref 98–107)
CREAT SERPL-MCNC: 2.74 MG/DL (ref 0.7–1.3)
EOSINOPHIL # BLD AUTO: 0.2 10*3/UL (ref 0–0.4)
EOSINOPHIL # BLD AUTO: 1.5 % (ref 1–4)
ERYTHROCYTE [DISTWIDTH] IN BLOOD BY AUTOMATED COUNT: 13.5 % (ref 0–14.5)
HCT VFR BLD AUTO: 41.5 % (ref 42–52)
HGB BLD-MCNC: 12.6 G/DL (ref 14–18)
LYMPHOCYTES # BLD AUTO: 1.6 10*3/UL (ref 1.3–4.4)
LYMPHOCYTES NFR BLD AUTO: 14.9 % (ref 27–41)
MCH RBC QN AUTO: 29 PG (ref 27–31)
MCHC RBC AUTO-ENTMCNC: 30.4 G/DL (ref 33–37)
MCV RBC AUTO: 95.4 FL (ref 80–94)
MONOCYTES # BLD AUTO: 1 10*3/UL (ref 0.1–1)
MONOCYTES NFR BLD MANUAL: 9.5 % (ref 3–9)
NEUT #: 8.1 10*3/UL (ref 2.3–7.9)
NEUT %: 73.4 % (ref 47–73)
NRBC BLD QL AUTO: 0 10*3/UL (ref 0–0)
PLATELET # BLD AUTO: 164 10*3/UL (ref 130–400)
PMV BLD AUTO: 11.7 FL (ref 9.6–12.3)
POTASSIUM SERPL-SCNC: 3.9 MMOL/L (ref 3.5–5.1)
RBC # BLD AUTO: 4.35 10*6/UL (ref 4.5–5.9)
SODIUM SERPL-SCNC: 144 MMOL/L (ref 136–145)
WBC NRBC COR # BLD AUTO: 11 10*3/UL (ref 4.8–10.8)

## 2017-10-12 NOTE — NUR
PULSE OX ON 2L AT REST 95%. O2 REMOVED PULSE OX DECREASING TO 88%. PT. PLACED
ON 2L O2, AMBULATED IN CCU, PULSE OX WAS 93%. PT. RETURNED TO ROOM, PLACED ON
2LM O2, RECOVERY PULSE OX WAS 96%.
B/P PRIOR TO AMBULATION /64, HEART RATE 66.
B/P POST AMBULATION /72, HEART RATE 68.
DR. GRACIA AND RN NOTIFIED.

## 2017-10-12 NOTE — NUR
PT HAS SLEPT WELL THIS PM.  POSITIONS SELF IN BED.  UNDERPAD AND GOWN CHANGED
AS ALVARADO, ALTHOUGH PATENT, LEAKED WHEN PT WAS TURNED ON HIS SIDE AND TUBING
WAS CRIMPED.

## 2017-10-12 NOTE — NUR
PHYSICAL THERAPY
PAtient evaluated in ICCU, full evaluation to follow. Continue with PT as per
plan of care with fall and cardiac precautions, Home with family and home
health RN. PAtient is low complexity via chart review, tests and evaluation:
[id number].  thank you for thius referral. alannah horowitz,PT

## 2017-10-12 NOTE — NUR
DUE TO INSURANCE COVERAGE, [facility] WAS CALLED FOR O[facility]
SUPPLY.[facility] # [phone].

## 2017-10-12 NOTE — NUR
Partial bath at bedside and then to chair. Bowers cath leaking . underpad
saturated. Folcy cath was dc'd.

## 2017-10-20 ENCOUNTER — HOSPITAL ENCOUNTER (OUTPATIENT)
Dept: HOSPITAL 83 - LAB | Age: 77
Discharge: HOME | End: 2017-10-20
Attending: PHYSICIAN ASSISTANT
Payer: COMMERCIAL

## 2017-10-20 DIAGNOSIS — C20: ICD-10-CM

## 2017-10-20 DIAGNOSIS — Z87.891: ICD-10-CM

## 2017-10-20 DIAGNOSIS — J90: ICD-10-CM

## 2017-10-20 DIAGNOSIS — J98.11: ICD-10-CM

## 2017-10-20 DIAGNOSIS — R06.02: ICD-10-CM

## 2017-10-20 DIAGNOSIS — Z01.818: Primary | ICD-10-CM

## 2017-10-20 DIAGNOSIS — R91.1: ICD-10-CM

## 2017-10-20 LAB
ALBUMIN SERPL-MCNC: 3.2 GM/DL (ref 3.1–4.5)
ALP SERPL-CCNC: 133 U/L (ref 45–117)
ALT SERPL W P-5'-P-CCNC: 20 U/L (ref 12–78)
AST SERPL-CCNC: 11 IU/L (ref 3–35)
BASOPHILS # BLD AUTO: 0 10*3/UL (ref 0–0.1)
BASOPHILS NFR BLD AUTO: 0.3 % (ref 0–1)
BUN SERPL-MCNC: 25 MG/DL (ref 7–24)
CEA SERPL-MCNC: 3 NG/ML
CHLORIDE SERPL-SCNC: 105 MMOL/L (ref 98–107)
CREAT SERPL-MCNC: 1.88 MG/DL (ref 0.7–1.3)
EOSINOPHIL # BLD AUTO: 0.2 10*3/UL (ref 0–0.4)
EOSINOPHIL # BLD AUTO: 2.2 % (ref 1–4)
ERYTHROCYTE [DISTWIDTH] IN BLOOD BY AUTOMATED COUNT: 13.4 % (ref 0–14.5)
HCT VFR BLD AUTO: 45.2 % (ref 42–52)
HGB BLD-MCNC: 13.8 G/DL (ref 14–18)
LYMPHOCYTES # BLD AUTO: 2 10*3/UL (ref 1.3–4.4)
LYMPHOCYTES NFR BLD AUTO: 19.5 % (ref 27–41)
MCH RBC QN AUTO: 29.1 PG (ref 27–31)
MCHC RBC AUTO-ENTMCNC: 30.5 G/DL (ref 33–37)
MCV RBC AUTO: 95.4 FL (ref 80–94)
MONOCYTES # BLD AUTO: 0.9 10*3/UL (ref 0.1–1)
MONOCYTES NFR BLD MANUAL: 9.1 % (ref 3–9)
NEUT #: 6.9 10*3/UL (ref 2.3–7.9)
NEUT %: 68.5 % (ref 47–73)
NRBC BLD QL AUTO: 0 10*3/UL (ref 0–0)
PLATELET # BLD AUTO: 206 10*3/UL (ref 130–400)
PMV BLD AUTO: 12.1 FL (ref 9.6–12.3)
POTASSIUM SERPL-SCNC: 4.7 MMOL/L (ref 3.5–5.1)
PROT SERPL-MCNC: 7.2 GM/DL (ref 6.4–8.2)
RBC # BLD AUTO: 4.74 10*6/UL (ref 4.5–5.9)
SODIUM SERPL-SCNC: 143 MMOL/L (ref 136–145)
WBC NRBC COR # BLD AUTO: 10 10*3/UL (ref 4.8–10.8)

## 2018-04-25 ENCOUNTER — HOSPITAL ENCOUNTER (OUTPATIENT)
Dept: HOSPITAL 83 - US | Age: 78
Discharge: HOME | End: 2018-04-25
Attending: PHYSICIAN ASSISTANT
Payer: COMMERCIAL

## 2018-04-25 DIAGNOSIS — K80.80: ICD-10-CM

## 2018-04-25 DIAGNOSIS — Z85.048: ICD-10-CM

## 2018-04-25 DIAGNOSIS — K76.0: Primary | ICD-10-CM

## 2021-05-27 ENCOUNTER — HOSPITAL ENCOUNTER (OUTPATIENT)
Dept: HOSPITAL 83 - LAB | Age: 81
Discharge: HOME | End: 2021-05-27
Attending: SPECIALIST
Payer: COMMERCIAL

## 2021-05-27 DIAGNOSIS — N17.9: Primary | ICD-10-CM

## 2021-05-27 LAB
BUN BLDV-MCNC: 37 MG/DL (ref 7–24)
BUN SERPL-MCNC: 37 MG/DL (ref 7–24)
CALCIUM SERPL-MCNC: 8.8 MG/DL (ref 8.5–10.5)
CHLORIDE BLD-SCNC: 111 MMOL/L (ref 98–107)
CHLORIDE SERPL-SCNC: 111 MMOL/L (ref 98–107)
CO2: 31 MMOL/L (ref 21–32)
CREAT SERPL-MCNC: 1.97 MG/DL (ref 0.7–1.3)
CREAT SERPL-MCNC: 1.97 MG/DL (ref 0.7–1.3)
GFR AFRICAN AMERICAN: 40 ML/MIN
GFR SERPL CREATININE-BSD FRML MDRD: 33 ML/MIN/
GLUCOSE: 131 MG/DL (ref 65–99)
POTASSIUM SERPL-SCNC: 4.3 MMOL/L (ref 3.5–5.1)
POTASSIUM SERPL-SCNC: 4.3 MMOL/L (ref 3.5–5.1)
SODIUM BLD-SCNC: 145 MMOL/L (ref 136–145)
SODIUM SERPL-SCNC: 145 MMOL/L (ref 136–145)

## 2021-09-19 ENCOUNTER — HOSPITAL ENCOUNTER (INPATIENT)
Dept: HOSPITAL 83 - ED | Age: 81
LOS: 6 days | Discharge: NURSING, REHAB OR LONG-TERM CARE | DRG: 291 | End: 2021-09-25
Attending: INTERNAL MEDICINE | Admitting: INTERNAL MEDICINE
Payer: COMMERCIAL

## 2021-09-19 VITALS — SYSTOLIC BLOOD PRESSURE: 161 MMHG | DIASTOLIC BLOOD PRESSURE: 97 MMHG

## 2021-09-19 VITALS — SYSTOLIC BLOOD PRESSURE: 137 MMHG | DIASTOLIC BLOOD PRESSURE: 94 MMHG

## 2021-09-19 VITALS — DIASTOLIC BLOOD PRESSURE: 85 MMHG

## 2021-09-19 VITALS — SYSTOLIC BLOOD PRESSURE: 123 MMHG | DIASTOLIC BLOOD PRESSURE: 88 MMHG

## 2021-09-19 VITALS — DIASTOLIC BLOOD PRESSURE: 89 MMHG | SYSTOLIC BLOOD PRESSURE: 135 MMHG

## 2021-09-19 VITALS — SYSTOLIC BLOOD PRESSURE: 135 MMHG | DIASTOLIC BLOOD PRESSURE: 89 MMHG

## 2021-09-19 VITALS — BODY MASS INDEX: 27.66 KG/M2 | HEIGHT: 68 IN | WEIGHT: 182.5 LBS | DIASTOLIC BLOOD PRESSURE: 78 MMHG

## 2021-09-19 VITALS — DIASTOLIC BLOOD PRESSURE: 89 MMHG

## 2021-09-19 VITALS — DIASTOLIC BLOOD PRESSURE: 73 MMHG | SYSTOLIC BLOOD PRESSURE: 118 MMHG

## 2021-09-19 VITALS — SYSTOLIC BLOOD PRESSURE: 151 MMHG | DIASTOLIC BLOOD PRESSURE: 73 MMHG

## 2021-09-19 VITALS — DIASTOLIC BLOOD PRESSURE: 91 MMHG

## 2021-09-19 VITALS — DIASTOLIC BLOOD PRESSURE: 71 MMHG

## 2021-09-19 VITALS — SYSTOLIC BLOOD PRESSURE: 140 MMHG | DIASTOLIC BLOOD PRESSURE: 89 MMHG

## 2021-09-19 DIAGNOSIS — R73.9: ICD-10-CM

## 2021-09-19 DIAGNOSIS — I48.20: ICD-10-CM

## 2021-09-19 DIAGNOSIS — I25.10: ICD-10-CM

## 2021-09-19 DIAGNOSIS — I11.0: Primary | ICD-10-CM

## 2021-09-19 DIAGNOSIS — I50.23: ICD-10-CM

## 2021-09-19 DIAGNOSIS — D64.9: ICD-10-CM

## 2021-09-19 DIAGNOSIS — Z20.822: ICD-10-CM

## 2021-09-19 DIAGNOSIS — R65.10: ICD-10-CM

## 2021-09-19 DIAGNOSIS — Z82.49: ICD-10-CM

## 2021-09-19 DIAGNOSIS — Z95.5: ICD-10-CM

## 2021-09-19 DIAGNOSIS — J96.01: ICD-10-CM

## 2021-09-19 DIAGNOSIS — Z79.899: ICD-10-CM

## 2021-09-19 DIAGNOSIS — E44.0: ICD-10-CM

## 2021-09-19 DIAGNOSIS — K21.9: ICD-10-CM

## 2021-09-19 DIAGNOSIS — N17.0: ICD-10-CM

## 2021-09-19 DIAGNOSIS — Z87.891: ICD-10-CM

## 2021-09-19 DIAGNOSIS — Z83.3: ICD-10-CM

## 2021-09-19 LAB
ALBUMIN SERPL-MCNC: 3 GM/DL (ref 3.1–4.5)
ALP SERPL-CCNC: 124 U/L (ref 45–117)
ALT SERPL W P-5'-P-CCNC: 19 U/L (ref 12–78)
AST SERPL-CCNC: 12 IU/L (ref 3–35)
BASOPHILS # BLD AUTO: 0 10*3/UL (ref 0–0.1)
BASOPHILS NFR BLD AUTO: 0.2 % (ref 0–1)
BUN SERPL-MCNC: 37 MG/DL (ref 7–24)
CHLORIDE SERPL-SCNC: 112 MMOL/L (ref 98–107)
CREAT SERPL-MCNC: 1.98 MG/DL (ref 0.7–1.3)
EOSINOPHIL # BLD AUTO: 0.3 10*3/UL (ref 0–0.4)
EOSINOPHIL # BLD AUTO: 3.4 % (ref 1–4)
ERYTHROCYTE [DISTWIDTH] IN BLOOD BY AUTOMATED COUNT: 15.5 % (ref 0–14.5)
HCT VFR BLD AUTO: 42.4 % (ref 42–52)
LYMPHOCYTES # BLD AUTO: 1.5 10*3/UL (ref 1.3–4.4)
LYMPHOCYTES NFR BLD AUTO: 15.8 % (ref 27–41)
MCH RBC QN AUTO: 27.2 PG (ref 27–31)
MCHC RBC AUTO-ENTMCNC: 29 G/DL (ref 33–37)
MCV RBC AUTO: 93.8 FL (ref 80–94)
MONOCYTES # BLD AUTO: 0.8 10*3/UL (ref 0.1–1)
MONOCYTES NFR BLD MANUAL: 8.4 % (ref 3–9)
NEUT #: 7 10*3/UL (ref 2.3–7.9)
NEUT %: 71.8 % (ref 47–73)
NRBC BLD QL AUTO: 0 10*3/UL (ref 0–0)
PLATELET # BLD AUTO: 226 10*3/UL (ref 130–400)
PMV BLD AUTO: 10.5 FL (ref 9.6–12.3)
POTASSIUM SERPL-SCNC: 5.1 MMOL/L (ref 3.5–5.1)
PROT SERPL-MCNC: 7.1 GM/DL (ref 6.4–8.2)
RBC # BLD AUTO: 4.52 10*6/UL (ref 4.5–5.9)
SODIUM SERPL-SCNC: 145 MMOL/L (ref 136–145)
TROPONIN I SERPL-MCNC: < 0.015 NG/ML (ref ?–0.04)
WBC NRBC COR # BLD AUTO: 9.8 10*3/UL (ref 4.8–10.8)

## 2021-09-19 PROCEDURE — 5A09357 ASSISTANCE WITH RESPIRATORY VENTILATION, LESS THAN 24 CONSECUTIVE HOURS, CONTINUOUS POSITIVE AIRWAY PRESSURE: ICD-10-PCS

## 2021-09-20 VITALS — SYSTOLIC BLOOD PRESSURE: 121 MMHG | DIASTOLIC BLOOD PRESSURE: 69 MMHG

## 2021-09-20 VITALS — DIASTOLIC BLOOD PRESSURE: 57 MMHG | SYSTOLIC BLOOD PRESSURE: 106 MMHG

## 2021-09-20 VITALS — SYSTOLIC BLOOD PRESSURE: 153 MMHG | DIASTOLIC BLOOD PRESSURE: 81 MMHG

## 2021-09-20 VITALS — DIASTOLIC BLOOD PRESSURE: 89 MMHG | SYSTOLIC BLOOD PRESSURE: 167 MMHG

## 2021-09-20 VITALS — SYSTOLIC BLOOD PRESSURE: 106 MMHG | DIASTOLIC BLOOD PRESSURE: 64 MMHG

## 2021-09-20 VITALS — DIASTOLIC BLOOD PRESSURE: 47 MMHG | SYSTOLIC BLOOD PRESSURE: 94 MMHG

## 2021-09-20 VITALS — SYSTOLIC BLOOD PRESSURE: 105 MMHG | DIASTOLIC BLOOD PRESSURE: 58 MMHG

## 2021-09-20 VITALS — SYSTOLIC BLOOD PRESSURE: 174 MMHG | DIASTOLIC BLOOD PRESSURE: 78 MMHG

## 2021-09-20 VITALS — DIASTOLIC BLOOD PRESSURE: 84 MMHG | SYSTOLIC BLOOD PRESSURE: 144 MMHG

## 2021-09-20 VITALS — DIASTOLIC BLOOD PRESSURE: 84 MMHG

## 2021-09-20 VITALS — DIASTOLIC BLOOD PRESSURE: 97 MMHG | SYSTOLIC BLOOD PRESSURE: 167 MMHG

## 2021-09-20 VITALS — DIASTOLIC BLOOD PRESSURE: 96 MMHG

## 2021-09-20 VITALS — SYSTOLIC BLOOD PRESSURE: 174 MMHG | DIASTOLIC BLOOD PRESSURE: 72 MMHG

## 2021-09-20 VITALS — SYSTOLIC BLOOD PRESSURE: 160 MMHG | DIASTOLIC BLOOD PRESSURE: 79 MMHG

## 2021-09-20 LAB
BASOPHILS # BLD AUTO: 0 10*3/UL (ref 0–0.1)
BASOPHILS NFR BLD AUTO: 0.3 % (ref 0–1)
BUN SERPL-MCNC: 38 MG/DL (ref 7–24)
CHLORIDE SERPL-SCNC: 111 MMOL/L (ref 98–107)
CHOLEST SERPL-MCNC: 112 MG/DL (ref ?–200)
CREAT SERPL-MCNC: 1.87 MG/DL (ref 0.7–1.3)
EOSINOPHIL # BLD AUTO: 0.3 10*3/UL (ref 0–0.4)
EOSINOPHIL # BLD AUTO: 3.9 % (ref 1–4)
ERYTHROCYTE [DISTWIDTH] IN BLOOD BY AUTOMATED COUNT: 15.3 % (ref 0–14.5)
HCT VFR BLD AUTO: 41.9 % (ref 42–52)
LDLC SERPL DIRECT ASSAY-MCNC: 56 MG/DL (ref 9–159)
LYMPHOCYTES # BLD AUTO: 1 10*3/UL (ref 1.3–4.4)
LYMPHOCYTES NFR BLD AUTO: 13.8 % (ref 27–41)
MCH RBC QN AUTO: 26.8 PG (ref 27–31)
MCHC RBC AUTO-ENTMCNC: 28.4 G/DL (ref 33–37)
MCV RBC AUTO: 94.4 FL (ref 80–94)
MONOCYTES # BLD AUTO: 0.7 10*3/UL (ref 0.1–1)
MONOCYTES NFR BLD MANUAL: 8.8 % (ref 3–9)
NEUT #: 5.5 10*3/UL (ref 2.3–7.9)
NEUT %: 72.9 % (ref 47–73)
NRBC BLD QL AUTO: 0 10*3/UL (ref 0–0)
PLATELET # BLD AUTO: 195 10*3/UL (ref 130–400)
PMV BLD AUTO: 10.9 FL (ref 9.6–12.3)
POTASSIUM SERPL-SCNC: 4.7 MMOL/L (ref 3.5–5.1)
RBC # BLD AUTO: 4.44 10*6/UL (ref 4.5–5.9)
SODIUM SERPL-SCNC: 144 MMOL/L (ref 136–145)
T4 FREE SERPL-MCNC: 1.24 NG/DL (ref 0.76–1.46)
TRIGL SERPL-MCNC: 71 MG/DL (ref ?–150)
TSH SERPL DL<=0.005 MIU/L-ACNC: 1.4 UIU/ML (ref 0.36–4.75)
WBC NRBC COR # BLD AUTO: 7.5 10*3/UL (ref 4.8–10.8)

## 2021-09-20 PROCEDURE — 5A09357 ASSISTANCE WITH RESPIRATORY VENTILATION, LESS THAN 24 CONSECUTIVE HOURS, CONTINUOUS POSITIVE AIRWAY PRESSURE: ICD-10-PCS

## 2021-09-21 VITALS — DIASTOLIC BLOOD PRESSURE: 78 MMHG

## 2021-09-21 VITALS — SYSTOLIC BLOOD PRESSURE: 111 MMHG | DIASTOLIC BLOOD PRESSURE: 61 MMHG

## 2021-09-21 VITALS — DIASTOLIC BLOOD PRESSURE: 104 MMHG

## 2021-09-21 VITALS — SYSTOLIC BLOOD PRESSURE: 108 MMHG | DIASTOLIC BLOOD PRESSURE: 62 MMHG

## 2021-09-21 VITALS — DIASTOLIC BLOOD PRESSURE: 75 MMHG

## 2021-09-21 VITALS — DIASTOLIC BLOOD PRESSURE: 84 MMHG

## 2021-09-21 VITALS — SYSTOLIC BLOOD PRESSURE: 172 MMHG | DIASTOLIC BLOOD PRESSURE: 83 MMHG

## 2021-09-21 VITALS — DIASTOLIC BLOOD PRESSURE: 76 MMHG

## 2021-09-21 VITALS — DIASTOLIC BLOOD PRESSURE: 89 MMHG

## 2021-09-21 VITALS — DIASTOLIC BLOOD PRESSURE: 69 MMHG

## 2021-09-21 VITALS — SYSTOLIC BLOOD PRESSURE: 159 MMHG | DIASTOLIC BLOOD PRESSURE: 90 MMHG

## 2021-09-21 LAB
BASOPHILS # BLD AUTO: 0 10*3/UL (ref 0–0.1)
BASOPHILS NFR BLD AUTO: 0.3 % (ref 0–1)
BUN SERPL-MCNC: 42 MG/DL (ref 7–24)
CHLORIDE SERPL-SCNC: 113 MMOL/L (ref 98–107)
CREAT SERPL-MCNC: 1.71 MG/DL (ref 0.7–1.3)
EOSINOPHIL # BLD AUTO: 0.2 10*3/UL (ref 0–0.4)
EOSINOPHIL # BLD AUTO: 3.4 % (ref 1–4)
ERYTHROCYTE [DISTWIDTH] IN BLOOD BY AUTOMATED COUNT: 15 % (ref 0–14.5)
HCT VFR BLD AUTO: 39 % (ref 42–52)
LYMPHOCYTES # BLD AUTO: 0.9 10*3/UL (ref 1.3–4.4)
LYMPHOCYTES NFR BLD AUTO: 14.2 % (ref 27–41)
MCH RBC QN AUTO: 27.4 PG (ref 27–31)
MCHC RBC AUTO-ENTMCNC: 28.7 G/DL (ref 33–37)
MCV RBC AUTO: 95.4 FL (ref 80–94)
MONOCYTES # BLD AUTO: 0.6 10*3/UL (ref 0.1–1)
MONOCYTES NFR BLD MANUAL: 9.7 % (ref 3–9)
NEUT #: 4.5 10*3/UL (ref 2.3–7.9)
NEUT %: 72.1 % (ref 47–73)
NRBC BLD QL AUTO: 0 % (ref 0–0)
PLATELET # BLD AUTO: 175 10*3/UL (ref 130–400)
PMV BLD AUTO: 10.4 FL (ref 9.6–12.3)
POTASSIUM SERPL-SCNC: 4.5 MMOL/L (ref 3.5–5.1)
RBC # BLD AUTO: 4.09 10*6/UL (ref 4.5–5.9)
SODIUM SERPL-SCNC: 145 MMOL/L (ref 136–145)
WBC NRBC COR # BLD AUTO: 6.2 10*3/UL (ref 4.8–10.8)

## 2021-09-21 PROCEDURE — 5A09357 ASSISTANCE WITH RESPIRATORY VENTILATION, LESS THAN 24 CONSECUTIVE HOURS, CONTINUOUS POSITIVE AIRWAY PRESSURE: ICD-10-PCS

## 2021-09-22 VITALS — DIASTOLIC BLOOD PRESSURE: 55 MMHG

## 2021-09-22 VITALS — DIASTOLIC BLOOD PRESSURE: 51 MMHG | SYSTOLIC BLOOD PRESSURE: 103 MMHG

## 2021-09-22 VITALS — DIASTOLIC BLOOD PRESSURE: 50 MMHG | SYSTOLIC BLOOD PRESSURE: 98 MMHG

## 2021-09-22 VITALS — DIASTOLIC BLOOD PRESSURE: 60 MMHG | SYSTOLIC BLOOD PRESSURE: 88 MMHG

## 2021-09-22 VITALS — DIASTOLIC BLOOD PRESSURE: 62 MMHG

## 2021-09-22 VITALS — DIASTOLIC BLOOD PRESSURE: 52 MMHG

## 2021-09-22 LAB
BASOPHILS # BLD AUTO: 0 10*3/UL (ref 0–0.1)
BASOPHILS NFR BLD AUTO: 0.2 % (ref 0–1)
BUN SERPL-MCNC: 50 MG/DL (ref 7–24)
CHLORIDE SERPL-SCNC: 111 MMOL/L (ref 98–107)
CREAT SERPL-MCNC: 1.88 MG/DL (ref 0.7–1.3)
EOSINOPHIL # BLD AUTO: 0.2 10*3/UL (ref 0–0.4)
EOSINOPHIL # BLD AUTO: 4.2 % (ref 1–4)
ERYTHROCYTE [DISTWIDTH] IN BLOOD BY AUTOMATED COUNT: 14.8 % (ref 0–14.5)
HCT VFR BLD AUTO: 35.7 % (ref 42–52)
LYMPHOCYTES # BLD AUTO: 0.9 10*3/UL (ref 1.3–4.4)
LYMPHOCYTES NFR BLD AUTO: 16.1 % (ref 27–41)
MCH RBC QN AUTO: 27 PG (ref 27–31)
MCHC RBC AUTO-ENTMCNC: 28.9 G/DL (ref 33–37)
MCV RBC AUTO: 93.5 FL (ref 80–94)
MONOCYTES # BLD AUTO: 0.7 10*3/UL (ref 0.1–1)
MONOCYTES NFR BLD MANUAL: 11.5 % (ref 3–9)
NEUT #: 3.8 10*3/UL (ref 2.3–7.9)
NEUT %: 67.6 % (ref 47–73)
NRBC BLD QL AUTO: 0 % (ref 0–0)
PLATELET # BLD AUTO: 188 10*3/UL (ref 130–400)
PMV BLD AUTO: 10.8 FL (ref 9.6–12.3)
POTASSIUM SERPL-SCNC: 4.4 MMOL/L (ref 3.5–5.1)
RBC # BLD AUTO: 3.82 10*6/UL (ref 4.5–5.9)
SODIUM SERPL-SCNC: 144 MMOL/L (ref 136–145)
WBC NRBC COR # BLD AUTO: 5.7 10*3/UL (ref 4.8–10.8)

## 2021-09-22 PROCEDURE — 5A09357 ASSISTANCE WITH RESPIRATORY VENTILATION, LESS THAN 24 CONSECUTIVE HOURS, CONTINUOUS POSITIVE AIRWAY PRESSURE: ICD-10-PCS

## 2021-09-23 VITALS — DIASTOLIC BLOOD PRESSURE: 81 MMHG | SYSTOLIC BLOOD PRESSURE: 138 MMHG

## 2021-09-23 VITALS — SYSTOLIC BLOOD PRESSURE: 112 MMHG | DIASTOLIC BLOOD PRESSURE: 80 MMHG

## 2021-09-23 VITALS — DIASTOLIC BLOOD PRESSURE: 64 MMHG | SYSTOLIC BLOOD PRESSURE: 116 MMHG

## 2021-09-23 VITALS — DIASTOLIC BLOOD PRESSURE: 81 MMHG | SYSTOLIC BLOOD PRESSURE: 114 MMHG

## 2021-09-23 VITALS — SYSTOLIC BLOOD PRESSURE: 116 MMHG | DIASTOLIC BLOOD PRESSURE: 79 MMHG

## 2021-09-23 LAB
BASOPHILS # BLD AUTO: 0 10*3/UL (ref 0–0.1)
BASOPHILS NFR BLD AUTO: 0.4 % (ref 0–1)
BUN SERPL-MCNC: 49 MG/DL (ref 7–24)
CHLORIDE SERPL-SCNC: 108 MMOL/L (ref 98–107)
CREAT SERPL-MCNC: 1.85 MG/DL (ref 0.7–1.3)
EOSINOPHIL # BLD AUTO: 0.2 10*3/UL (ref 0–0.4)
EOSINOPHIL # BLD AUTO: 3.4 % (ref 1–4)
ERYTHROCYTE [DISTWIDTH] IN BLOOD BY AUTOMATED COUNT: 14.6 % (ref 0–14.5)
HCT VFR BLD AUTO: 39.5 % (ref 42–52)
LYMPHOCYTES # BLD AUTO: 0.7 10*3/UL (ref 1.3–4.4)
LYMPHOCYTES NFR BLD AUTO: 14.2 % (ref 27–41)
MCH RBC QN AUTO: 26.8 PG (ref 27–31)
MCHC RBC AUTO-ENTMCNC: 28.9 G/DL (ref 33–37)
MCV RBC AUTO: 92.9 FL (ref 80–94)
MONOCYTES # BLD AUTO: 0.6 10*3/UL (ref 0.1–1)
MONOCYTES NFR BLD MANUAL: 12.6 % (ref 3–9)
NEUT #: 3.5 10*3/UL (ref 2.3–7.9)
NEUT %: 69 % (ref 47–73)
NRBC BLD QL AUTO: 0 % (ref 0–0)
PLATELET # BLD AUTO: 195 10*3/UL (ref 130–400)
PMV BLD AUTO: 10.7 FL (ref 9.6–12.3)
POTASSIUM SERPL-SCNC: 4.1 MMOL/L (ref 3.5–5.1)
RBC # BLD AUTO: 4.25 10*6/UL (ref 4.5–5.9)
SODIUM SERPL-SCNC: 143 MMOL/L (ref 136–145)
WBC NRBC COR # BLD AUTO: 5 10*3/UL (ref 4.8–10.8)

## 2021-09-24 VITALS — DIASTOLIC BLOOD PRESSURE: 95 MMHG | SYSTOLIC BLOOD PRESSURE: 115 MMHG

## 2021-09-24 VITALS — DIASTOLIC BLOOD PRESSURE: 54 MMHG | SYSTOLIC BLOOD PRESSURE: 106 MMHG

## 2021-09-24 VITALS — SYSTOLIC BLOOD PRESSURE: 118 MMHG | DIASTOLIC BLOOD PRESSURE: 61 MMHG

## 2021-09-24 VITALS — SYSTOLIC BLOOD PRESSURE: 121 MMHG | DIASTOLIC BLOOD PRESSURE: 54 MMHG

## 2021-09-24 VITALS — DIASTOLIC BLOOD PRESSURE: 90 MMHG

## 2021-09-24 VITALS — DIASTOLIC BLOOD PRESSURE: 67 MMHG

## 2021-09-24 LAB
BASOPHILS # BLD AUTO: 0 10*3/UL (ref 0–0.1)
BASOPHILS NFR BLD AUTO: 0.3 % (ref 0–1)
BUN SERPL-MCNC: 51 MG/DL (ref 7–24)
CHLORIDE SERPL-SCNC: 104 MMOL/L (ref 98–107)
CREAT SERPL-MCNC: 1.82 MG/DL (ref 0.7–1.3)
EOSINOPHIL # BLD AUTO: 0.3 10*3/UL (ref 0–0.4)
EOSINOPHIL # BLD AUTO: 4.1 % (ref 1–4)
ERYTHROCYTE [DISTWIDTH] IN BLOOD BY AUTOMATED COUNT: 14.6 % (ref 0–14.5)
HCT VFR BLD AUTO: 36.3 % (ref 42–52)
LYMPHOCYTES # BLD AUTO: 1.1 10*3/UL (ref 1.3–4.4)
LYMPHOCYTES NFR BLD AUTO: 15.9 % (ref 27–41)
MCH RBC QN AUTO: 27 PG (ref 27–31)
MCHC RBC AUTO-ENTMCNC: 30.3 G/DL (ref 33–37)
MCV RBC AUTO: 89 FL (ref 80–94)
MONOCYTES # BLD AUTO: 0.9 10*3/UL (ref 0.1–1)
MONOCYTES NFR BLD MANUAL: 13.3 % (ref 3–9)
NEUT #: 4.5 10*3/UL (ref 2.3–7.9)
NEUT %: 66.1 % (ref 47–73)
NRBC BLD QL AUTO: 0 10*3/UL (ref 0–0)
PLATELET # BLD AUTO: 222 10*3/UL (ref 130–400)
PMV BLD AUTO: 11.1 FL (ref 9.6–12.3)
POTASSIUM SERPL-SCNC: 3.6 MMOL/L (ref 3.5–5.1)
RBC # BLD AUTO: 4.08 10*6/UL (ref 4.5–5.9)
SODIUM SERPL-SCNC: 142 MMOL/L (ref 136–145)
WBC NRBC COR # BLD AUTO: 6.8 10*3/UL (ref 4.8–10.8)

## 2021-09-25 VITALS — DIASTOLIC BLOOD PRESSURE: 56 MMHG | SYSTOLIC BLOOD PRESSURE: 118 MMHG

## 2021-09-25 VITALS — DIASTOLIC BLOOD PRESSURE: 67 MMHG

## 2021-09-25 VITALS — DIASTOLIC BLOOD PRESSURE: 46 MMHG

## 2021-09-25 VITALS — DIASTOLIC BLOOD PRESSURE: 54 MMHG

## 2021-09-25 LAB
BASOPHILS # BLD AUTO: 0 10*3/UL (ref 0–0.1)
BASOPHILS NFR BLD AUTO: 0.3 % (ref 0–1)
BUN SERPL-MCNC: 54 MG/DL (ref 7–24)
CHLORIDE SERPL-SCNC: 102 MMOL/L (ref 98–107)
CREAT SERPL-MCNC: 2.06 MG/DL (ref 0.7–1.3)
EOSINOPHIL # BLD AUTO: 0.2 10*3/UL (ref 0–0.4)
EOSINOPHIL # BLD AUTO: 3.1 % (ref 1–4)
ERYTHROCYTE [DISTWIDTH] IN BLOOD BY AUTOMATED COUNT: 14.7 % (ref 0–14.5)
HCT VFR BLD AUTO: 36.2 % (ref 42–52)
LYMPHOCYTES # BLD AUTO: 1.1 10*3/UL (ref 1.3–4.4)
LYMPHOCYTES NFR BLD AUTO: 17.2 % (ref 27–41)
MCH RBC QN AUTO: 27 PG (ref 27–31)
MCHC RBC AUTO-ENTMCNC: 30.1 G/DL (ref 33–37)
MCV RBC AUTO: 89.6 FL (ref 80–94)
MONOCYTES # BLD AUTO: 0.8 10*3/UL (ref 0.1–1)
MONOCYTES NFR BLD MANUAL: 12.5 % (ref 3–9)
NEUT #: 4.3 10*3/UL (ref 2.3–7.9)
NEUT %: 66.6 % (ref 47–73)
NRBC BLD QL AUTO: 0 % (ref 0–0)
PLATELET # BLD AUTO: 225 10*3/UL (ref 130–400)
PMV BLD AUTO: 10.9 FL (ref 9.6–12.3)
POTASSIUM SERPL-SCNC: 3.5 MMOL/L (ref 3.5–5.1)
RBC # BLD AUTO: 4.04 10*6/UL (ref 4.5–5.9)
SODIUM SERPL-SCNC: 141 MMOL/L (ref 136–145)
WBC NRBC COR # BLD AUTO: 6.4 10*3/UL (ref 4.8–10.8)

## 2021-11-22 ENCOUNTER — HOSPITAL ENCOUNTER (INPATIENT)
Dept: HOSPITAL 83 - ED | Age: 81
LOS: 15 days | Discharge: HOME | DRG: 871 | End: 2021-12-07
Attending: INTERNAL MEDICINE | Admitting: INTERNAL MEDICINE
Payer: COMMERCIAL

## 2021-11-22 VITALS — DIASTOLIC BLOOD PRESSURE: 103 MMHG | SYSTOLIC BLOOD PRESSURE: 151 MMHG

## 2021-11-22 VITALS — BODY MASS INDEX: 26.87 KG/M2 | WEIGHT: 181.44 LBS | HEIGHT: 68.98 IN

## 2021-11-22 VITALS — DIASTOLIC BLOOD PRESSURE: 97 MMHG

## 2021-11-22 DIAGNOSIS — K57.90: ICD-10-CM

## 2021-11-22 DIAGNOSIS — D64.9: ICD-10-CM

## 2021-11-22 DIAGNOSIS — Z83.3: ICD-10-CM

## 2021-11-22 DIAGNOSIS — I13.0: ICD-10-CM

## 2021-11-22 DIAGNOSIS — K21.9: ICD-10-CM

## 2021-11-22 DIAGNOSIS — I45.10: ICD-10-CM

## 2021-11-22 DIAGNOSIS — I50.23: ICD-10-CM

## 2021-11-22 DIAGNOSIS — D68.59: ICD-10-CM

## 2021-11-22 DIAGNOSIS — I48.91: ICD-10-CM

## 2021-11-22 DIAGNOSIS — A41.9: Primary | ICD-10-CM

## 2021-11-22 DIAGNOSIS — E87.8: ICD-10-CM

## 2021-11-22 DIAGNOSIS — N17.0: ICD-10-CM

## 2021-11-22 DIAGNOSIS — J93.9: ICD-10-CM

## 2021-11-22 DIAGNOSIS — N18.32: ICD-10-CM

## 2021-11-22 DIAGNOSIS — Z82.49: ICD-10-CM

## 2021-11-22 DIAGNOSIS — J96.02: ICD-10-CM

## 2021-11-22 DIAGNOSIS — I25.10: ICD-10-CM

## 2021-11-22 DIAGNOSIS — K80.20: ICD-10-CM

## 2021-11-22 DIAGNOSIS — K29.70: ICD-10-CM

## 2021-11-22 DIAGNOSIS — R73.9: ICD-10-CM

## 2021-11-22 DIAGNOSIS — E43: ICD-10-CM

## 2021-11-22 DIAGNOSIS — J98.11: ICD-10-CM

## 2021-11-22 DIAGNOSIS — E83.41: ICD-10-CM

## 2021-11-22 DIAGNOSIS — J15.6: ICD-10-CM

## 2021-11-22 DIAGNOSIS — J91.8: ICD-10-CM

## 2021-11-22 DIAGNOSIS — R18.8: ICD-10-CM

## 2021-11-22 LAB
ALBUMIN SERPL-MCNC: 2.7 GM/DL (ref 3.1–4.5)
ALP SERPL-CCNC: 106 U/L (ref 45–117)
ALT SERPL W P-5'-P-CCNC: 18 U/L (ref 12–78)
APTT PPP: 29.5 SECONDS (ref 20–32.1)
AST SERPL-CCNC: 13 IU/L (ref 3–35)
BASE EXCESS BLDA CALC-SCNC: 6.4 MMOL/L (ref -2–2)
BASOPHILS # BLD AUTO: 0 10*3/UL (ref 0–0.1)
BASOPHILS NFR BLD AUTO: 0.2 % (ref 0–1)
BUN SERPL-MCNC: 44 MG/DL (ref 7–24)
CHLORIDE SERPL-SCNC: 108 MMOL/L (ref 98–107)
CREAT SERPL-MCNC: 2.23 MG/DL (ref 0.7–1.3)
EOSINOPHIL # BLD AUTO: 0.2 10*3/UL (ref 0–0.4)
EOSINOPHIL # BLD AUTO: 1.8 % (ref 1–4)
ERYTHROCYTE [DISTWIDTH] IN BLOOD BY AUTOMATED COUNT: 14.7 % (ref 0–14.5)
HCT VFR BLD AUTO: 38.1 % (ref 42–52)
INR BLD: 1.1 (ref 2–3.5)
LYMPHOCYTES # BLD AUTO: 1 10*3/UL (ref 1.3–4.4)
LYMPHOCYTES NFR BLD AUTO: 11.5 % (ref 27–41)
MCH RBC QN AUTO: 27.6 PG (ref 27–31)
MCHC RBC AUTO-ENTMCNC: 29.7 G/DL (ref 33–37)
MCV RBC AUTO: 93.2 FL (ref 80–94)
MONOCYTES # BLD AUTO: 0.9 10*3/UL (ref 0.1–1)
MONOCYTES NFR BLD MANUAL: 10.3 % (ref 3–9)
NEUT #: 6.8 10*3/UL (ref 2.3–7.9)
NEUT %: 75.9 % (ref 47–73)
NRBC BLD QL AUTO: 0 10*3/UL (ref 0–0)
PCO2 BLDA: 58 MMHG (ref 35–45)
PH BLDA: 7.38 [PH] (ref 7.35–7.45)
PLATELET # BLD AUTO: 227 10*3/UL (ref 130–400)
PMV BLD AUTO: 10.3 FL (ref 9.6–12.3)
PO2 BLDA: 105.5 MM[HG] (ref 80–90)
POTASSIUM SERPL-SCNC: 4.3 MMOL/L (ref 3.5–5.1)
PROT SERPL-MCNC: 7.1 GM/DL (ref 6.4–8.2)
RBC # BLD AUTO: 4.09 10*6/UL (ref 4.5–5.9)
SAO2 % BLDA: 98 % (ref 95–97)
SODIUM SERPL-SCNC: 143 MMOL/L (ref 136–145)
TROPONIN I SERPL-MCNC: < 0.015 NG/ML (ref ?–0.04)
WBC NRBC COR # BLD AUTO: 8.9 10*3/UL (ref 4.8–10.8)

## 2021-11-22 PROCEDURE — 5A09357 ASSISTANCE WITH RESPIRATORY VENTILATION, LESS THAN 24 CONSECUTIVE HOURS, CONTINUOUS POSITIVE AIRWAY PRESSURE: ICD-10-PCS | Performed by: STUDENT IN AN ORGANIZED HEALTH CARE EDUCATION/TRAINING PROGRAM

## 2021-11-23 VITALS — DIASTOLIC BLOOD PRESSURE: 76 MMHG

## 2021-11-23 VITALS — DIASTOLIC BLOOD PRESSURE: 78 MMHG

## 2021-11-23 VITALS — DIASTOLIC BLOOD PRESSURE: 79 MMHG | SYSTOLIC BLOOD PRESSURE: 127 MMHG

## 2021-11-23 VITALS — DIASTOLIC BLOOD PRESSURE: 91 MMHG

## 2021-11-23 VITALS — SYSTOLIC BLOOD PRESSURE: 117 MMHG | DIASTOLIC BLOOD PRESSURE: 72 MMHG

## 2021-11-23 VITALS — SYSTOLIC BLOOD PRESSURE: 105 MMHG | DIASTOLIC BLOOD PRESSURE: 77 MMHG

## 2021-11-23 VITALS — DIASTOLIC BLOOD PRESSURE: 61 MMHG

## 2021-11-23 VITALS — DIASTOLIC BLOOD PRESSURE: 82 MMHG

## 2021-11-23 VITALS — SYSTOLIC BLOOD PRESSURE: 121 MMHG | DIASTOLIC BLOOD PRESSURE: 82 MMHG

## 2021-11-23 LAB
BASE EXCESS BLDA CALC-SCNC: 5.5 MMOL/L (ref -2–2)
BASOPHILS # BLD AUTO: 0 10*3/UL (ref 0–0.1)
BASOPHILS NFR BLD AUTO: 0.1 % (ref 0–1)
BUN SERPL-MCNC: 44 MG/DL (ref 7–24)
CHLORIDE SERPL-SCNC: 108 MMOL/L (ref 98–107)
CREAT SERPL-MCNC: 2.15 MG/DL (ref 0.7–1.3)
EOSINOPHIL # BLD AUTO: 0.1 10*3/UL (ref 0–0.4)
EOSINOPHIL # BLD AUTO: 1.7 % (ref 1–4)
ERYTHROCYTE [DISTWIDTH] IN BLOOD BY AUTOMATED COUNT: 14.6 % (ref 0–14.5)
HCT VFR BLD AUTO: 37 % (ref 42–52)
LYMPHOCYTES # BLD AUTO: 1 10*3/UL (ref 1.3–4.4)
LYMPHOCYTES NFR BLD AUTO: 12.8 % (ref 27–41)
MCH RBC QN AUTO: 27.4 PG (ref 27–31)
MCHC RBC AUTO-ENTMCNC: 29.7 G/DL (ref 33–37)
MCV RBC AUTO: 92 FL (ref 80–94)
MONOCYTES # BLD AUTO: 0.8 10*3/UL (ref 0.1–1)
MONOCYTES NFR BLD MANUAL: 9.7 % (ref 3–9)
NEUT #: 6.1 10*3/UL (ref 2.3–7.9)
NEUT %: 75.3 % (ref 47–73)
NRBC BLD QL AUTO: 0 10*3/UL (ref 0–0)
PCO2 BLDA: 63 MMHG (ref 35–45)
PH BLDA: 7.34 [PH] (ref 7.35–7.45)
PLATELET # BLD AUTO: 221 10*3/UL (ref 130–400)
PMV BLD AUTO: 10.8 FL (ref 9.6–12.3)
PO2 BLDA: 129.6 MM[HG] (ref 80–90)
POTASSIUM SERPL-SCNC: 4.1 MMOL/L (ref 3.5–5.1)
RBC # BLD AUTO: 4.02 10*6/UL (ref 4.5–5.9)
SAO2 % BLDA: 98 % (ref 95–97)
SODIUM SERPL-SCNC: 145 MMOL/L (ref 136–145)
WBC NRBC COR # BLD AUTO: 8.2 10*3/UL (ref 4.8–10.8)

## 2021-11-24 VITALS — DIASTOLIC BLOOD PRESSURE: 82 MMHG | SYSTOLIC BLOOD PRESSURE: 119 MMHG

## 2021-11-24 VITALS — DIASTOLIC BLOOD PRESSURE: 81 MMHG | SYSTOLIC BLOOD PRESSURE: 106 MMHG

## 2021-11-24 VITALS — SYSTOLIC BLOOD PRESSURE: 126 MMHG | DIASTOLIC BLOOD PRESSURE: 77 MMHG

## 2021-11-24 VITALS — DIASTOLIC BLOOD PRESSURE: 90 MMHG

## 2021-11-24 VITALS — DIASTOLIC BLOOD PRESSURE: 79 MMHG | SYSTOLIC BLOOD PRESSURE: 139 MMHG

## 2021-11-24 LAB
ALBUMIN SERPL-MCNC: 2.5 GM/DL (ref 3.1–4.5)
ALP SERPL-CCNC: 106 U/L (ref 45–117)
ALT SERPL W P-5'-P-CCNC: 21 U/L (ref 12–78)
AST SERPL-CCNC: 17 IU/L (ref 3–35)
BUN SERPL-MCNC: 43 MG/DL (ref 7–24)
CHLORIDE SERPL-SCNC: 107 MMOL/L (ref 98–107)
CREAT SERPL-MCNC: 2.21 MG/DL (ref 0.7–1.3)
LV EF: 44 %
LVEF MODALITY: NORMAL
POTASSIUM SERPL-SCNC: 3.5 MMOL/L (ref 3.5–5.1)
PROT SERPL-MCNC: 6.6 GM/DL (ref 6.4–8.2)
SODIUM SERPL-SCNC: 144 MMOL/L (ref 136–145)

## 2021-11-24 PROCEDURE — 4A02XM4 MEASUREMENT OF CARDIAC TOTAL ACTIVITY, EXTERNAL APPROACH: ICD-10-PCS | Performed by: INTERNAL MEDICINE

## 2021-11-24 PROCEDURE — 3E073KZ INTRODUCTION OF OTHER DIAGNOSTIC SUBSTANCE INTO CORONARY ARTERY, PERCUTANEOUS APPROACH: ICD-10-PCS | Performed by: INTERNAL MEDICINE

## 2021-11-25 VITALS — DIASTOLIC BLOOD PRESSURE: 77 MMHG | SYSTOLIC BLOOD PRESSURE: 131 MMHG

## 2021-11-25 VITALS — SYSTOLIC BLOOD PRESSURE: 137 MMHG | DIASTOLIC BLOOD PRESSURE: 81 MMHG

## 2021-11-25 VITALS — DIASTOLIC BLOOD PRESSURE: 82 MMHG | SYSTOLIC BLOOD PRESSURE: 125 MMHG

## 2021-11-25 VITALS — DIASTOLIC BLOOD PRESSURE: 61 MMHG

## 2021-11-25 VITALS — SYSTOLIC BLOOD PRESSURE: 115 MMHG | DIASTOLIC BLOOD PRESSURE: 60 MMHG

## 2021-11-25 VITALS — DIASTOLIC BLOOD PRESSURE: 56 MMHG | SYSTOLIC BLOOD PRESSURE: 112 MMHG

## 2021-11-25 VITALS — DIASTOLIC BLOOD PRESSURE: 50 MMHG

## 2021-11-25 LAB
BASOPHILS # BLD AUTO: 0 10*3/UL (ref 0–0.1)
BASOPHILS NFR BLD AUTO: 0.2 % (ref 0–1)
BUN SERPL-MCNC: 46 MG/DL (ref 7–24)
CHLORIDE SERPL-SCNC: 105 MMOL/L (ref 98–107)
CREAT SERPL-MCNC: 2.24 MG/DL (ref 0.7–1.3)
EOSINOPHIL # BLD AUTO: 0.2 10*3/UL (ref 0–0.4)
EOSINOPHIL # BLD AUTO: 2.1 % (ref 1–4)
ERYTHROCYTE [DISTWIDTH] IN BLOOD BY AUTOMATED COUNT: 14.6 % (ref 0–14.5)
HCT VFR BLD AUTO: 36.7 % (ref 42–52)
LYMPHOCYTES # BLD AUTO: 1.2 10*3/UL (ref 1.3–4.4)
LYMPHOCYTES NFR BLD AUTO: 14.2 % (ref 27–41)
MCH RBC QN AUTO: 27.2 PG (ref 27–31)
MCHC RBC AUTO-ENTMCNC: 28.9 G/DL (ref 33–37)
MCV RBC AUTO: 94.1 FL (ref 80–94)
MONOCYTES # BLD AUTO: 0.9 10*3/UL (ref 0.1–1)
MONOCYTES NFR BLD MANUAL: 10.5 % (ref 3–9)
NEUT #: 6.3 10*3/UL (ref 2.3–7.9)
NEUT %: 72.8 % (ref 47–73)
NRBC BLD QL AUTO: 0 % (ref 0–0)
PLATELET # BLD AUTO: 203 10*3/UL (ref 130–400)
PMV BLD AUTO: 10.9 FL (ref 9.6–12.3)
POTASSIUM SERPL-SCNC: 3.4 MMOL/L (ref 3.5–5.1)
RBC # BLD AUTO: 3.9 10*6/UL (ref 4.5–5.9)
SODIUM SERPL-SCNC: 144 MMOL/L (ref 136–145)
WBC NRBC COR # BLD AUTO: 8.6 10*3/UL (ref 4.8–10.8)

## 2021-11-26 VITALS — DIASTOLIC BLOOD PRESSURE: 54 MMHG | SYSTOLIC BLOOD PRESSURE: 102 MMHG

## 2021-11-26 VITALS — DIASTOLIC BLOOD PRESSURE: 84 MMHG | SYSTOLIC BLOOD PRESSURE: 133 MMHG

## 2021-11-26 VITALS — DIASTOLIC BLOOD PRESSURE: 77 MMHG | SYSTOLIC BLOOD PRESSURE: 127 MMHG

## 2021-11-26 VITALS — DIASTOLIC BLOOD PRESSURE: 71 MMHG

## 2021-11-26 VITALS — DIASTOLIC BLOOD PRESSURE: 86 MMHG

## 2021-11-26 LAB
ALBUMIN SERPL-MCNC: 2.6 GM/DL (ref 3.1–4.5)
ALP SERPL-CCNC: 103 U/L (ref 45–117)
ALT SERPL W P-5'-P-CCNC: 23 U/L (ref 12–78)
AST SERPL-CCNC: 15 IU/L (ref 3–35)
BUN SERPL-MCNC: 51 MG/DL (ref 7–24)
CHLORIDE SERPL-SCNC: 107 MMOL/L (ref 98–107)
CREAT SERPL-MCNC: 2.29 MG/DL (ref 0.7–1.3)
ERYTHROCYTE [DISTWIDTH] IN BLOOD BY AUTOMATED COUNT: 14.3 % (ref 0–14.5)
HCT VFR BLD AUTO: 36.3 % (ref 42–52)
MCH RBC QN AUTO: 27.2 PG (ref 27–31)
MCHC RBC AUTO-ENTMCNC: 29.5 G/DL (ref 33–37)
MCV RBC AUTO: 92.1 FL (ref 80–94)
NRBC BLD QL AUTO: 0 % (ref 0–0)
PLATELET # BLD AUTO: 194 10*3/UL (ref 130–400)
PLATELET SUFFICIENCY: NORMAL
PMV BLD AUTO: 11.1 FL (ref 9.6–12.3)
POTASSIUM SERPL-SCNC: 4.2 MMOL/L (ref 3.5–5.1)
PROT SERPL-MCNC: 6.8 GM/DL (ref 6.4–8.2)
RBC # BLD AUTO: 3.94 10*6/UL (ref 4.5–5.9)
RBC MORPH BLD: NORMAL
SODIUM SERPL-SCNC: 141 MMOL/L (ref 136–145)
TOTAL CELLS COUNTED: 100 #CELLS
WBC NRBC COR # BLD AUTO: 12.3 10*3/UL (ref 4.8–10.8)

## 2021-11-27 VITALS — DIASTOLIC BLOOD PRESSURE: 65 MMHG | SYSTOLIC BLOOD PRESSURE: 110 MMHG

## 2021-11-27 VITALS — DIASTOLIC BLOOD PRESSURE: 59 MMHG

## 2021-11-27 VITALS — DIASTOLIC BLOOD PRESSURE: 56 MMHG

## 2021-11-27 VITALS — DIASTOLIC BLOOD PRESSURE: 88 MMHG

## 2021-11-27 VITALS — DIASTOLIC BLOOD PRESSURE: 81 MMHG

## 2021-11-27 LAB
BUN SERPL-MCNC: 55 MG/DL (ref 7–24)
CHLORIDE SERPL-SCNC: 105 MMOL/L (ref 98–107)
CREAT SERPL-MCNC: 2.12 MG/DL (ref 0.7–1.3)
ERYTHROCYTE [DISTWIDTH] IN BLOOD BY AUTOMATED COUNT: 14.3 % (ref 0–14.5)
HCT VFR BLD AUTO: 35.2 % (ref 42–52)
MCH RBC QN AUTO: 27.2 PG (ref 27–31)
MCHC RBC AUTO-ENTMCNC: 30.1 G/DL (ref 33–37)
MCV RBC AUTO: 90.3 FL (ref 80–94)
NRBC BLD QL AUTO: 0 10*3/UL (ref 0–0)
PLATELET # BLD AUTO: 186 10*3/UL (ref 130–400)
PLATELET SUFFICIENCY: NORMAL
PMV BLD AUTO: 11 FL (ref 9.6–12.3)
POTASSIUM SERPL-SCNC: 4.5 MMOL/L (ref 3.5–5.1)
RBC # BLD AUTO: 3.9 10*6/UL (ref 4.5–5.9)
SODIUM SERPL-SCNC: 140 MMOL/L (ref 136–145)
TOTAL CELLS COUNTED: 100 #CELLS
WBC NRBC COR # BLD AUTO: 13.4 10*3/UL (ref 4.8–10.8)

## 2021-11-28 VITALS — DIASTOLIC BLOOD PRESSURE: 99 MMHG

## 2021-11-28 VITALS — DIASTOLIC BLOOD PRESSURE: 72 MMHG

## 2021-11-28 VITALS — DIASTOLIC BLOOD PRESSURE: 86 MMHG

## 2021-11-28 VITALS — DIASTOLIC BLOOD PRESSURE: 62 MMHG

## 2021-11-28 VITALS — DIASTOLIC BLOOD PRESSURE: 80 MMHG

## 2021-11-28 LAB
ALBUMIN SERPL-MCNC: 2.7 GM/DL (ref 3.1–4.5)
ALP SERPL-CCNC: 105 U/L (ref 45–117)
ALT SERPL W P-5'-P-CCNC: 36 U/L (ref 12–78)
AST SERPL-CCNC: 16 IU/L (ref 3–35)
BUN SERPL-MCNC: 70 MG/DL (ref 7–24)
CHLORIDE SERPL-SCNC: 104 MMOL/L (ref 98–107)
CREAT SERPL-MCNC: 2.13 MG/DL (ref 0.7–1.3)
POTASSIUM SERPL-SCNC: 4.6 MMOL/L (ref 3.5–5.1)
PROT SERPL-MCNC: 6.7 GM/DL (ref 6.4–8.2)
SODIUM SERPL-SCNC: 140 MMOL/L (ref 136–145)

## 2021-11-29 VITALS — SYSTOLIC BLOOD PRESSURE: 116 MMHG | DIASTOLIC BLOOD PRESSURE: 64 MMHG

## 2021-11-29 VITALS — DIASTOLIC BLOOD PRESSURE: 70 MMHG

## 2021-11-29 VITALS — SYSTOLIC BLOOD PRESSURE: 94 MMHG | DIASTOLIC BLOOD PRESSURE: 57 MMHG

## 2021-11-29 VITALS — DIASTOLIC BLOOD PRESSURE: 81 MMHG

## 2021-11-29 VITALS — DIASTOLIC BLOOD PRESSURE: 62 MMHG

## 2021-11-29 LAB
ALBUMIN SERPL-MCNC: 2.7 GM/DL (ref 3.1–4.5)
ALP SERPL-CCNC: 99 U/L (ref 45–117)
ALT SERPL W P-5'-P-CCNC: 40 U/L (ref 12–78)
AST SERPL-CCNC: 13 IU/L (ref 3–35)
BASOPHILS # BLD AUTO: 0 10*3/UL (ref 0–0.1)
BASOPHILS NFR BLD AUTO: 0.1 % (ref 0–1)
BUN SERPL-MCNC: 76 MG/DL (ref 7–24)
CHLORIDE SERPL-SCNC: 104 MMOL/L (ref 98–107)
CREAT SERPL-MCNC: 2.16 MG/DL (ref 0.7–1.3)
EOSINOPHIL # BLD AUTO: 0 % (ref 1–4)
EOSINOPHIL # BLD AUTO: 0 10*3/UL (ref 0–0.4)
ERYTHROCYTE [DISTWIDTH] IN BLOOD BY AUTOMATED COUNT: 14.5 % (ref 0–14.5)
HCT VFR BLD AUTO: 37.6 % (ref 42–52)
LYMPHOCYTES # BLD AUTO: 0.6 10*3/UL (ref 1.3–4.4)
LYMPHOCYTES NFR BLD AUTO: 4.4 % (ref 27–41)
MCH RBC QN AUTO: 27.6 PG (ref 27–31)
MCHC RBC AUTO-ENTMCNC: 29.8 G/DL (ref 33–37)
MCV RBC AUTO: 92.6 FL (ref 80–94)
MONOCYTES # BLD AUTO: 0.6 10*3/UL (ref 0.1–1)
MONOCYTES NFR BLD MANUAL: 5 % (ref 3–9)
NEUT #: 11.3 10*3/UL (ref 2.3–7.9)
NEUT %: 89.9 % (ref 47–73)
NRBC BLD QL AUTO: 0 10*3/UL (ref 0–0)
PLATELET # BLD AUTO: 178 10*3/UL (ref 130–400)
PMV BLD AUTO: 11.3 FL (ref 9.6–12.3)
POTASSIUM SERPL-SCNC: 4.9 MMOL/L (ref 3.5–5.1)
PROT SERPL-MCNC: 6.7 GM/DL (ref 6.4–8.2)
RBC # BLD AUTO: 4.06 10*6/UL (ref 4.5–5.9)
SODIUM SERPL-SCNC: 139 MMOL/L (ref 136–145)
WBC NRBC COR # BLD AUTO: 12.6 10*3/UL (ref 4.8–10.8)

## 2021-11-30 VITALS — DIASTOLIC BLOOD PRESSURE: 83 MMHG | SYSTOLIC BLOOD PRESSURE: 143 MMHG

## 2021-11-30 VITALS — DIASTOLIC BLOOD PRESSURE: 68 MMHG | SYSTOLIC BLOOD PRESSURE: 110 MMHG

## 2021-11-30 VITALS — DIASTOLIC BLOOD PRESSURE: 56 MMHG

## 2021-11-30 VITALS — DIASTOLIC BLOOD PRESSURE: 65 MMHG

## 2021-11-30 VITALS — DIASTOLIC BLOOD PRESSURE: 85 MMHG

## 2021-11-30 LAB
BASOPHILS # BLD AUTO: 0 10*3/UL (ref 0–0.1)
BASOPHILS NFR BLD AUTO: 0.1 % (ref 0–1)
BUN SERPL-MCNC: 96 MG/DL (ref 7–24)
CHLORIDE SERPL-SCNC: 103 MMOL/L (ref 98–107)
CREAT SERPL-MCNC: 2.25 MG/DL (ref 0.7–1.3)
EOSINOPHIL # BLD AUTO: 0 % (ref 1–4)
EOSINOPHIL # BLD AUTO: 0 10*3/UL (ref 0–0.4)
ERYTHROCYTE [DISTWIDTH] IN BLOOD BY AUTOMATED COUNT: 14.4 % (ref 0–14.5)
HCT VFR BLD AUTO: 36.8 % (ref 42–52)
LYMPHOCYTES # BLD AUTO: 0.4 10*3/UL (ref 1.3–4.4)
LYMPHOCYTES NFR BLD AUTO: 3.4 % (ref 27–41)
MCH RBC QN AUTO: 27.3 PG (ref 27–31)
MCHC RBC AUTO-ENTMCNC: 29.6 G/DL (ref 33–37)
MCV RBC AUTO: 92 FL (ref 80–94)
MONOCYTES # BLD AUTO: 0.6 10*3/UL (ref 0.1–1)
MONOCYTES NFR BLD MANUAL: 4.9 % (ref 3–9)
NEUT #: 11.6 10*3/UL (ref 2.3–7.9)
NEUT %: 90 % (ref 47–73)
NRBC BLD QL AUTO: 0 % (ref 0–0)
PLATELET # BLD AUTO: 174 10*3/UL (ref 130–400)
PMV BLD AUTO: 11.5 FL (ref 9.6–12.3)
POTASSIUM SERPL-SCNC: 5.2 MMOL/L (ref 3.5–5.1)
RBC # BLD AUTO: 4 10*6/UL (ref 4.5–5.9)
SODIUM SERPL-SCNC: 139 MMOL/L (ref 136–145)
WBC NRBC COR # BLD AUTO: 12.8 10*3/UL (ref 4.8–10.8)

## 2021-12-01 VITALS — SYSTOLIC BLOOD PRESSURE: 145 MMHG | DIASTOLIC BLOOD PRESSURE: 97 MMHG

## 2021-12-01 VITALS — SYSTOLIC BLOOD PRESSURE: 154 MMHG | DIASTOLIC BLOOD PRESSURE: 93 MMHG

## 2021-12-01 VITALS — DIASTOLIC BLOOD PRESSURE: 92 MMHG

## 2021-12-01 VITALS — DIASTOLIC BLOOD PRESSURE: 72 MMHG

## 2021-12-01 VITALS — SYSTOLIC BLOOD PRESSURE: 93 MMHG | DIASTOLIC BLOOD PRESSURE: 48 MMHG

## 2021-12-01 LAB
BUN SERPL-MCNC: 93 MG/DL (ref 7–24)
CHLORIDE SERPL-SCNC: 105 MMOL/L (ref 98–107)
CREAT SERPL-MCNC: 2.14 MG/DL (ref 0.7–1.3)
INR BLD: 1.1 (ref 2–3.5)
POTASSIUM SERPL-SCNC: 5 MMOL/L (ref 3.5–5.1)
SODIUM SERPL-SCNC: 140 MMOL/L (ref 136–145)

## 2021-12-02 VITALS — DIASTOLIC BLOOD PRESSURE: 88 MMHG

## 2021-12-02 VITALS — SYSTOLIC BLOOD PRESSURE: 138 MMHG | DIASTOLIC BLOOD PRESSURE: 85 MMHG

## 2021-12-02 VITALS — DIASTOLIC BLOOD PRESSURE: 84 MMHG | SYSTOLIC BLOOD PRESSURE: 148 MMHG

## 2021-12-02 VITALS — SYSTOLIC BLOOD PRESSURE: 138 MMHG | DIASTOLIC BLOOD PRESSURE: 94 MMHG

## 2021-12-02 VITALS — DIASTOLIC BLOOD PRESSURE: 61 MMHG

## 2021-12-02 LAB
BASOPHILS # BLD AUTO: 0 10*3/UL (ref 0–0.1)
BASOPHILS NFR BLD AUTO: 0.1 % (ref 0–1)
BUN SERPL-MCNC: 97 MG/DL (ref 7–24)
CHLORIDE SERPL-SCNC: 109 MMOL/L (ref 98–107)
CREAT SERPL-MCNC: 2.15 MG/DL (ref 0.7–1.3)
DEPRECATED POLYS/LEUK NFR FLD: 10 %
DEPRECATED POLYS/LEUK NFR FLD: 2 %
EOSINOPHIL # BLD AUTO: 0 % (ref 1–4)
EOSINOPHIL # BLD AUTO: 0 10*3/UL (ref 0–0.4)
ERYTHROCYTE [DISTWIDTH] IN BLOOD BY AUTOMATED COUNT: 14.7 % (ref 0–14.5)
HCT VFR BLD AUTO: 37.2 % (ref 42–52)
LYMPHOCYTES # BLD AUTO: 0.5 10*3/UL (ref 1.3–4.4)
LYMPHOCYTES NFR BLD AUTO: 3.6 % (ref 27–41)
MACROPHAGES NFR FLD MANUAL: 2 %
MACROPHAGES NFR FLD MANUAL: 5 %
MCH RBC QN AUTO: 27.3 PG (ref 27–31)
MCHC RBC AUTO-ENTMCNC: 29.3 G/DL (ref 33–37)
MCV RBC AUTO: 93 FL (ref 80–94)
MESOTHL CELL NFR FLD MANUAL: 2 %
MONOCYTES # BLD AUTO: 1 10*3/UL (ref 0.1–1)
MONOCYTES NFR BLD MANUAL: 7.6 % (ref 3–9)
NEUT #: 11.9 10*3/UL (ref 2.3–7.9)
NEUT %: 87.2 % (ref 47–73)
NEUTROPHILS NFR FLD MANUAL: 85 %
NEUTROPHILS NFR FLD MANUAL: 94 %
NRBC BLD QL AUTO: 0.1 % (ref 0–0)
PLATELET # BLD AUTO: 159 10*3/UL (ref 130–400)
PMV BLD AUTO: 12.1 FL (ref 9.6–12.3)
POTASSIUM SERPL-SCNC: 5.6 MMOL/L (ref 3.5–5.1)
RBC # BLD AUTO: 4 10*6/UL (ref 4.5–5.9)
SODIUM SERPL-SCNC: 140 MMOL/L (ref 136–145)
WBC # FLD: 426 /UL
WBC # FLD: 884 /UL
WBC NRBC COR # BLD AUTO: 13.6 10*3/UL (ref 4.8–10.8)

## 2021-12-02 PROCEDURE — 0W9B30Z DRAINAGE OF LEFT PLEURAL CAVITY WITH DRAINAGE DEVICE, PERCUTANEOUS APPROACH: ICD-10-PCS | Performed by: RADIOLOGY

## 2021-12-02 PROCEDURE — 0W9930Z DRAINAGE OF RIGHT PLEURAL CAVITY WITH DRAINAGE DEVICE, PERCUTANEOUS APPROACH: ICD-10-PCS | Performed by: RADIOLOGY

## 2021-12-03 VITALS — DIASTOLIC BLOOD PRESSURE: 66 MMHG | SYSTOLIC BLOOD PRESSURE: 108 MMHG

## 2021-12-03 VITALS — DIASTOLIC BLOOD PRESSURE: 82 MMHG

## 2021-12-03 VITALS — DIASTOLIC BLOOD PRESSURE: 67 MMHG

## 2021-12-03 VITALS — DIASTOLIC BLOOD PRESSURE: 100 MMHG

## 2021-12-03 VITALS — DIASTOLIC BLOOD PRESSURE: 76 MMHG

## 2021-12-03 LAB
BASOPHILS # BLD AUTO: 0 10*3/UL (ref 0–0.1)
BASOPHILS NFR BLD AUTO: 0.1 % (ref 0–1)
BUN SERPL-MCNC: 104 MG/DL (ref 7–24)
CHLORIDE SERPL-SCNC: 106 MMOL/L (ref 98–107)
CREAT SERPL-MCNC: 2.2 MG/DL (ref 0.7–1.3)
EOSINOPHIL # BLD AUTO: 0 % (ref 1–4)
EOSINOPHIL # BLD AUTO: 0 10*3/UL (ref 0–0.4)
ERYTHROCYTE [DISTWIDTH] IN BLOOD BY AUTOMATED COUNT: 14.9 % (ref 0–14.5)
HCT VFR BLD AUTO: 39 % (ref 42–52)
LYMPHOCYTES # BLD AUTO: 0.4 10*3/UL (ref 1.3–4.4)
LYMPHOCYTES NFR BLD AUTO: 2.8 % (ref 27–41)
MCH RBC QN AUTO: 27.6 PG (ref 27–31)
MCHC RBC AUTO-ENTMCNC: 30 G/DL (ref 33–37)
MCV RBC AUTO: 92 FL (ref 80–94)
MONOCYTES # BLD AUTO: 1.2 10*3/UL (ref 0.1–1)
MONOCYTES NFR BLD MANUAL: 8.4 % (ref 3–9)
NEUT #: 12.5 10*3/UL (ref 2.3–7.9)
NEUT %: 87.6 % (ref 47–73)
NRBC BLD QL AUTO: 0 10*3/UL (ref 0–0)
PLATELET # BLD AUTO: 163 10*3/UL (ref 130–400)
PMV BLD AUTO: 12.5 FL (ref 9.6–12.3)
POTASSIUM SERPL-SCNC: 4.9 MMOL/L (ref 3.5–5.1)
RBC # BLD AUTO: 4.24 10*6/UL (ref 4.5–5.9)
SODIUM SERPL-SCNC: 138 MMOL/L (ref 136–145)
WBC NRBC COR # BLD AUTO: 14.2 10*3/UL (ref 4.8–10.8)

## 2021-12-04 VITALS — SYSTOLIC BLOOD PRESSURE: 100 MMHG | DIASTOLIC BLOOD PRESSURE: 59 MMHG

## 2021-12-04 VITALS — DIASTOLIC BLOOD PRESSURE: 85 MMHG

## 2021-12-04 VITALS — DIASTOLIC BLOOD PRESSURE: 71 MMHG

## 2021-12-04 VITALS — DIASTOLIC BLOOD PRESSURE: 77 MMHG | SYSTOLIC BLOOD PRESSURE: 122 MMHG

## 2021-12-04 VITALS — DIASTOLIC BLOOD PRESSURE: 84 MMHG | SYSTOLIC BLOOD PRESSURE: 127 MMHG

## 2021-12-04 VITALS — DIASTOLIC BLOOD PRESSURE: 78 MMHG

## 2021-12-04 LAB
BASOPHILS # BLD AUTO: 0 10*3/UL (ref 0–0.1)
BASOPHILS NFR BLD AUTO: 0.1 % (ref 0–1)
BUN SERPL-MCNC: 102 MG/DL (ref 7–24)
CHLORIDE SERPL-SCNC: 108 MMOL/L (ref 98–107)
CREAT SERPL-MCNC: 2.07 MG/DL (ref 0.7–1.3)
EOSINOPHIL # BLD AUTO: 0 % (ref 1–4)
EOSINOPHIL # BLD AUTO: 0 10*3/UL (ref 0–0.4)
ERYTHROCYTE [DISTWIDTH] IN BLOOD BY AUTOMATED COUNT: 15 % (ref 0–14.5)
HCT VFR BLD AUTO: 37.2 % (ref 42–52)
LYMPHOCYTES # BLD AUTO: 0.5 10*3/UL (ref 1.3–4.4)
LYMPHOCYTES NFR BLD AUTO: 3.3 % (ref 27–41)
MCH RBC QN AUTO: 27.4 PG (ref 27–31)
MCHC RBC AUTO-ENTMCNC: 30.4 G/DL (ref 33–37)
MCV RBC AUTO: 90.1 FL (ref 80–94)
MONOCYTES # BLD AUTO: 1.4 10*3/UL (ref 0.1–1)
MONOCYTES NFR BLD MANUAL: 8.1 % (ref 3–9)
NEUT #: 14.5 10*3/UL (ref 2.3–7.9)
NEUT %: 87.4 % (ref 47–73)
NRBC BLD QL AUTO: 0.1 % (ref 0–0)
PLATELET # BLD AUTO: 148 10*3/UL (ref 130–400)
PMV BLD AUTO: 11.7 FL (ref 9.6–12.3)
POTASSIUM SERPL-SCNC: 4.6 MMOL/L (ref 3.5–5.1)
RBC # BLD AUTO: 4.13 10*6/UL (ref 4.5–5.9)
SODIUM SERPL-SCNC: 138 MMOL/L (ref 136–145)
WBC NRBC COR # BLD AUTO: 16.6 10*3/UL (ref 4.8–10.8)

## 2021-12-05 VITALS — DIASTOLIC BLOOD PRESSURE: 57 MMHG

## 2021-12-05 VITALS — DIASTOLIC BLOOD PRESSURE: 82 MMHG | SYSTOLIC BLOOD PRESSURE: 151 MMHG

## 2021-12-05 VITALS — DIASTOLIC BLOOD PRESSURE: 95 MMHG

## 2021-12-05 VITALS — DIASTOLIC BLOOD PRESSURE: 50 MMHG

## 2021-12-05 VITALS — SYSTOLIC BLOOD PRESSURE: 134 MMHG | DIASTOLIC BLOOD PRESSURE: 79 MMHG

## 2021-12-05 VITALS — DIASTOLIC BLOOD PRESSURE: 89 MMHG | SYSTOLIC BLOOD PRESSURE: 136 MMHG

## 2021-12-05 VITALS — DIASTOLIC BLOOD PRESSURE: 86 MMHG

## 2021-12-05 LAB
BASOPHILS # BLD AUTO: 0 10*3/UL (ref 0–0.1)
BASOPHILS NFR BLD AUTO: 0.1 % (ref 0–1)
BUN SERPL-MCNC: 100 MG/DL (ref 7–24)
CHLORIDE SERPL-SCNC: 109 MMOL/L (ref 98–107)
CREAT SERPL-MCNC: 2.09 MG/DL (ref 0.7–1.3)
EOSINOPHIL # BLD AUTO: 0 % (ref 1–4)
EOSINOPHIL # BLD AUTO: 0 10*3/UL (ref 0–0.4)
ERYTHROCYTE [DISTWIDTH] IN BLOOD BY AUTOMATED COUNT: 15.2 % (ref 0–14.5)
HCT VFR BLD AUTO: 39 % (ref 42–52)
LYMPHOCYTES # BLD AUTO: 0.5 10*3/UL (ref 1.3–4.4)
LYMPHOCYTES NFR BLD AUTO: 3 % (ref 27–41)
MCH RBC QN AUTO: 27.4 PG (ref 27–31)
MCHC RBC AUTO-ENTMCNC: 30 G/DL (ref 33–37)
MCV RBC AUTO: 91.3 FL (ref 80–94)
MONOCYTES # BLD AUTO: 1.1 10*3/UL (ref 0.1–1)
MONOCYTES NFR BLD MANUAL: 6.8 % (ref 3–9)
NEUT #: 14.7 10*3/UL (ref 2.3–7.9)
NEUT %: 88.5 % (ref 47–73)
NRBC BLD QL AUTO: 0 10*3/UL (ref 0–0)
PLATELET # BLD AUTO: 157 10*3/UL (ref 130–400)
PMV BLD AUTO: 13 FL (ref 9.6–12.3)
POTASSIUM SERPL-SCNC: 4.6 MMOL/L (ref 3.5–5.1)
RBC # BLD AUTO: 4.27 10*6/UL (ref 4.5–5.9)
SODIUM SERPL-SCNC: 140 MMOL/L (ref 136–145)
WBC NRBC COR # BLD AUTO: 16.6 10*3/UL (ref 4.8–10.8)

## 2021-12-06 VITALS — DIASTOLIC BLOOD PRESSURE: 82 MMHG

## 2021-12-06 VITALS — DIASTOLIC BLOOD PRESSURE: 80 MMHG

## 2021-12-06 VITALS — DIASTOLIC BLOOD PRESSURE: 72 MMHG

## 2021-12-06 VITALS — DIASTOLIC BLOOD PRESSURE: 74 MMHG

## 2021-12-06 VITALS — DIASTOLIC BLOOD PRESSURE: 78 MMHG | SYSTOLIC BLOOD PRESSURE: 134 MMHG

## 2021-12-06 LAB
BASOPHILS # BLD AUTO: 0 10*3/UL (ref 0–0.1)
BASOPHILS NFR BLD AUTO: 0.1 % (ref 0–1)
BUN SERPL-MCNC: 94 MG/DL (ref 7–24)
CHLORIDE SERPL-SCNC: 110 MMOL/L (ref 98–107)
CREAT SERPL-MCNC: 1.82 MG/DL (ref 0.7–1.3)
EOSINOPHIL # BLD AUTO: 0 % (ref 1–4)
EOSINOPHIL # BLD AUTO: 0 10*3/UL (ref 0–0.4)
ERYTHROCYTE [DISTWIDTH] IN BLOOD BY AUTOMATED COUNT: 15.3 % (ref 0–14.5)
HCT VFR BLD AUTO: 36 % (ref 42–52)
LYMPHOCYTES # BLD AUTO: 0.4 10*3/UL (ref 1.3–4.4)
LYMPHOCYTES NFR BLD AUTO: 2.8 % (ref 27–41)
MCH RBC QN AUTO: 27.7 PG (ref 27–31)
MCHC RBC AUTO-ENTMCNC: 30.6 G/DL (ref 33–37)
MCV RBC AUTO: 90.7 FL (ref 80–94)
MONOCYTES # BLD AUTO: 1 10*3/UL (ref 0.1–1)
MONOCYTES NFR BLD MANUAL: 6.7 % (ref 3–9)
NEUT #: 13.6 10*3/UL (ref 2.3–7.9)
NEUT %: 89 % (ref 47–73)
NRBC BLD QL AUTO: 0 10*3/UL (ref 0–0)
PLATELET # BLD AUTO: 139 10*3/UL (ref 130–400)
PMV BLD AUTO: 12 FL (ref 9.6–12.3)
POTASSIUM SERPL-SCNC: 4.5 MMOL/L (ref 3.5–5.1)
RBC # BLD AUTO: 3.97 10*6/UL (ref 4.5–5.9)
SODIUM SERPL-SCNC: 141 MMOL/L (ref 136–145)
WBC NRBC COR # BLD AUTO: 15.3 10*3/UL (ref 4.8–10.8)

## 2021-12-07 VITALS — DIASTOLIC BLOOD PRESSURE: 65 MMHG

## 2021-12-07 VITALS — DIASTOLIC BLOOD PRESSURE: 78 MMHG | SYSTOLIC BLOOD PRESSURE: 138 MMHG

## 2021-12-07 VITALS — DIASTOLIC BLOOD PRESSURE: 59 MMHG

## 2021-12-07 LAB
BASOPHILS # BLD AUTO: 0 10*3/UL (ref 0–0.1)
BASOPHILS NFR BLD AUTO: 0.2 % (ref 0–1)
BUN SERPL-MCNC: 84 MG/DL (ref 7–24)
CHLORIDE SERPL-SCNC: 109 MMOL/L (ref 98–107)
CREAT SERPL-MCNC: 1.78 MG/DL (ref 0.7–1.3)
EOSINOPHIL # BLD AUTO: 0 % (ref 1–4)
EOSINOPHIL # BLD AUTO: 0 10*3/UL (ref 0–0.4)
ERYTHROCYTE [DISTWIDTH] IN BLOOD BY AUTOMATED COUNT: 15.2 % (ref 0–14.5)
HCT VFR BLD AUTO: 37.8 % (ref 42–52)
LYMPHOCYTES # BLD AUTO: 0.4 10*3/UL (ref 1.3–4.4)
LYMPHOCYTES NFR BLD AUTO: 2.7 % (ref 27–41)
MCH RBC QN AUTO: 27.4 PG (ref 27–31)
MCHC RBC AUTO-ENTMCNC: 29.9 G/DL (ref 33–37)
MCV RBC AUTO: 91.5 FL (ref 80–94)
MONOCYTES # BLD AUTO: 1 10*3/UL (ref 0.1–1)
MONOCYTES NFR BLD MANUAL: 5.8 % (ref 3–9)
NEUT #: 14.9 10*3/UL (ref 2.3–7.9)
NEUT %: 89.7 % (ref 47–73)
NRBC BLD QL AUTO: 0 % (ref 0–0)
PLATELET # BLD AUTO: 145 10*3/UL (ref 130–400)
PMV BLD AUTO: 11.8 FL (ref 9.6–12.3)
POTASSIUM SERPL-SCNC: 4.5 MMOL/L (ref 3.5–5.1)
RBC # BLD AUTO: 4.13 10*6/UL (ref 4.5–5.9)
SODIUM SERPL-SCNC: 140 MMOL/L (ref 136–145)
WBC NRBC COR # BLD AUTO: 16.6 10*3/UL (ref 4.8–10.8)

## 2021-12-09 ENCOUNTER — TELEPHONE (OUTPATIENT)
Dept: NON INVASIVE DIAGNOSTICS | Age: 81
End: 2021-12-09

## 2021-12-09 ENCOUNTER — TELEPHONE (OUTPATIENT)
Dept: CARDIOLOGY CLINIC | Age: 81
End: 2021-12-09

## 2021-12-09 NOTE — TELEPHONE ENCOUNTER
Received a phone call from [address] [facility]) and that the patient sustained a LifeVest shock and passed away on 2021. [facility] spoke the patients son last night (2021 at 11:45PM). LifeVest strips were scanned into the chart. Also spoke to Mikki yuly at [facility] (Dr. Luci Mayfield coordinator) and let her know also that the patient .

## 2021-12-09 NOTE — TELEPHONE ENCOUNTER
Received a phone call from [address] [facility]) and that the patient sustained a LifeVest shock and passed away on 2021. [facility] spoke the patients son last night (2021 at 11:45PM). LifeVest strips were scanned into the chart. Also spoke to [place] at [facility] (Dr. Ruby Cat coordinator) and let her know also that the patient .